# Patient Record
Sex: MALE | Race: BLACK OR AFRICAN AMERICAN | Employment: FULL TIME | URBAN - METROPOLITAN AREA
[De-identification: names, ages, dates, MRNs, and addresses within clinical notes are randomized per-mention and may not be internally consistent; named-entity substitution may affect disease eponyms.]

---

## 2017-05-18 ENCOUNTER — HOSPITAL ENCOUNTER (EMERGENCY)
Age: 37
Discharge: HOME | End: 2017-05-18
Attending: EMERGENCY MEDICINE
Payer: SELF-PAY

## 2017-05-18 VITALS
DIASTOLIC BLOOD PRESSURE: 106 MMHG | BODY MASS INDEX: 34.46 KG/M2 | TEMPERATURE: 98.5 F | WEIGHT: 220 LBS | RESPIRATION RATE: 15 BRPM | HEART RATE: 81 BPM | OXYGEN SATURATION: 100 % | SYSTOLIC BLOOD PRESSURE: 158 MMHG

## 2017-05-18 DIAGNOSIS — M79.672 LEFT FOOT PAIN: ICD-10-CM

## 2017-05-18 DIAGNOSIS — R03.0 ELEVATED BLOOD PRESSURE READING: ICD-10-CM

## 2017-05-18 DIAGNOSIS — Z76.0 MEDICATION REFILL: Primary | ICD-10-CM

## 2017-05-18 PROCEDURE — 99282 EMERGENCY DEPT VISIT SF MDM: CPT

## 2017-05-18 RX ORDER — METFORMIN HYDROCHLORIDE 500 MG/1
500 TABLET ORAL 2 TIMES DAILY WITH MEALS
Qty: 60 TAB | Refills: 1 | Status: SHIPPED | OUTPATIENT
Start: 2017-05-18 | End: 2017-06-17

## 2017-05-18 RX ORDER — LISINOPRIL 20 MG/1
20 TABLET ORAL DAILY
Qty: 30 TAB | Refills: 1 | Status: SHIPPED | OUTPATIENT
Start: 2017-05-18 | End: 2021-11-19 | Stop reason: CLARIF

## 2017-05-18 NOTE — DISCHARGE INSTRUCTIONS
Elevated Blood Pressure: Care Instructions  Your Care Instructions    Blood pressure is a measure of how hard the blood pushes against the walls of your arteries. It's normal for blood pressure to go up and down throughout the day. But if it stays up over time, you have high blood pressure. Two numbers tell you your blood pressure. The first number is the systolic pressure. It shows how hard the blood pushes when your heart is pumping. The second number is the diastolic pressure. It shows how hard the blood pushes between heartbeats, when your heart is relaxed and filling with blood. An ideal blood pressure in adults is less than 120/80 (say \"120 over 80\"). High blood pressure is 140/90 or higher. You have high blood pressure if your top number is 140 or higher or your bottom number is 90 or higher, or both. The main test for high blood pressure is simple, fast, and painless. To diagnose high blood pressure, your doctor will test your blood pressure at different times. After testing your blood pressure, your doctor may ask you to test it again when you are home. If you are diagnosed with high blood pressure, you can work with your doctor to make a long-term plan to manage it. Follow-up care is a key part of your treatment and safety. Be sure to make and go to all appointments, and call your doctor if you are having problems. It's also a good idea to know your test results and keep a list of the medicines you take. How can you care for yourself at home? · Do not smoke. Smoking increases your risk for heart attack and stroke. If you need help quitting, talk to your doctor about stop-smoking programs and medicines. These can increase your chances of quitting for good. · Stay at a healthy weight. · Try to limit how much sodium you eat to less than 2,300 milligrams (mg) a day. Your doctor may ask you to try to eat less than 1,500 mg a day. · Be physically active.  Get at least 30 minutes of exercise on most days of the week. Walking is a good choice. You also may want to do other activities, such as running, swimming, cycling, or playing tennis or team sports. · Avoid or limit alcohol. Talk to your doctor about whether you can drink any alcohol. · Eat plenty of fruits, vegetables, and low-fat dairy products. Eat less saturated and total fats. · Learn how to check your blood pressure at home. When should you call for help? Call your doctor now or seek immediate medical care if:  · Your blood pressure is much higher than normal (such as 180/110 or higher). · You think high blood pressure is causing symptoms such as:  ¨ Severe headache. ¨ Blurry vision. Watch closely for changes in your health, and be sure to contact your doctor if:  · You do not get better as expected. Where can you learn more? Go to http://patrickMedEncentivepaulette.info/. Enter M664 in the search box to learn more about \"Elevated Blood Pressure: Care Instructions. \"  Current as of: October 19, 2016  Content Version: 11.2  © 1352-5188 [employer]. Care instructions adapted under license by [facility] (which disclaims liability or warranty for this information). If you have questions about a medical condition or this instruction, always ask your healthcare professional. [address] any warranty or liability for your use of this information. Foot Pain: Care Instructions  Your Care Instructions  Foot injuries that cause pain and swelling are fairly common. Almost all sports or home repair projects can cause a misstep that ends up as foot pain. Normal wear and tear, especially as you get older, also can cause foot pain. Most minor foot injuries will heal on their own, and home treatment is usually all you need to do. If you have a severe injury, you may need tests and treatment. Follow-up care is a key part of your treatment and safety.  Be sure to make and go to all appointments, and call your doctor if you are having problems. Its also a good idea to know your test results and keep a list of the medicines you take. How can you care for yourself at home? · Take pain medicines exactly as directed. ¨ If the doctor gave you a prescription medicine for pain, take it as prescribed. ¨ If you are not taking a prescription pain medicine, ask your doctor if you can take an over-the-counter medicine. · Rest and protect your foot. Take a break from any activity that may cause pain. · Put ice or a cold pack on your foot for 10 to 20 minutes at a time. Put a thin cloth between the ice and your skin. · Prop up the sore foot on a pillow when you ice it or anytime you sit or lie down during the next 3 days. Try to keep it above the level of your heart. This will help reduce swelling. · Your doctor may recommend that you wrap your foot with an elastic bandage. Keep your foot wrapped for as long as your doctor advises. · If your doctor recommends crutches, use them as directed. · Wear roomy footwear. · As soon as pain and swelling end, begin gentle exercises of your foot. Your doctor can tell you which exercises will help. When should you call for help? Call 911 anytime you think you may need emergency care. For example, call if:  · Your foot turns pale, white, blue, or cold. Call your doctor now or seek immediate medical care if:  · You cannot move or stand on your foot. · Your foot looks twisted or out of its normal position. · Your foot is not stable when you step down. · You have signs of infection, such as:  ¨ Increased pain, swelling, warmth, or redness. ¨ Red streaks leading from the sore area. ¨ Pus draining from a place on your foot. ¨ A fever. · Your foot is numb or tingly. Watch closely for changes in your health, and be sure to contact your doctor if:  · You do not get better as expected. · You have bruises from an injury that last longer than 2 weeks.   Where can you learn more?  Go to http://patrick-paulette.info/. Enter P164 in the search box to learn more about \"Foot Pain: Care Instructions. \"  Current as of: May 23, 2016  Content Version: 11.2  © 2432-7284 [employer]. Care instructions adapted under license by [facility] (which disclaims liability or warranty for this information). If you have questions about a medical condition or this instruction, always ask your healthcare professional. [facility] [address] any warranty or liability for your use of this information. DataC[employer]d Activation    Thank you for requesting access to [employer]. Please follow the instructions below to securely access and download your online medical record. [employer] allows you to send messages to your doctor, view your test results, renew your prescriptions, schedule appointments, and more. How Do I Sign Up? 1. In your internet browser, go to [email or web address]  2. Click on the First Time User? Click Here link in the Sign In box. You will be redirect to the New Member Sign Up page. 3. Enter your [employer] Access Code exactly as it appears below. You will not need to use this code after youve completed the sign-up process. If you do not sign up before the expiration date, you must request a new code. [employer]d Access Code: [email or web address]  Expires: 2017 11:15 AM (This is the date your [employer] access code will )    4. Enter the last four digits of your Social Security Number (xxxx) and Date of Birth (mm/dd/yyyy) as indicated and click Submit. You will be taken to the next sign-up page. 5. Create a DataCrowd ID. This will be your [employer]d login ID and cannot be changed, so think of one that is secure and easy to remember. 6. Create a [employer]d password. You can change your password at any time. 7. Enter your Password Reset Question and Answer. This can be used at a later time if you forget your password. 8. Enter your e-mail address. You will receive e-mail notification when new information is available in [address] 9. Click Sign Up. You can now view and download portions of your medical record. 10. Click the Download Summary menu link to download a portable copy of your medical information. Additional Information    If you have questions, please visit the Frequently Asked Questions section of the [facility] website at [email or web address] Remember, [facility] is NOT to be used for urgent needs. For medical emergencies, dial 911.

## 2021-11-17 ENCOUNTER — TRANSCRIBE ORDER (OUTPATIENT)
Dept: SCHEDULING | Age: 41
End: 2021-11-17

## 2021-11-17 DIAGNOSIS — C20 MALIGNANT NEOPLASM OF RECTUM (HCC): Primary | ICD-10-CM

## 2021-11-18 ENCOUNTER — HOSPITAL ENCOUNTER (OUTPATIENT)
Dept: LAB | Age: 41
Discharge: HOME | End: 2021-11-18

## 2021-11-18 ENCOUNTER — HOSPITAL ENCOUNTER (OUTPATIENT)
Dept: LAB | Age: 41
Discharge: HOME | End: 2021-11-18
Payer: COMMERCIAL

## 2021-11-18 ENCOUNTER — OFFICE VISIT (OUTPATIENT)
Dept: SURGERY | Age: 41
End: 2021-11-18
Payer: COMMERCIAL

## 2021-11-18 VITALS
DIASTOLIC BLOOD PRESSURE: 80 MMHG | WEIGHT: 229 LBS | RESPIRATION RATE: 18 BRPM | HEART RATE: 78 BPM | OXYGEN SATURATION: 99 % | TEMPERATURE: 97.8 F | SYSTOLIC BLOOD PRESSURE: 150 MMHG | HEIGHT: 67 IN | BODY MASS INDEX: 35.94 KG/M2

## 2021-11-18 DIAGNOSIS — C20 RECTAL CANCER (HCC): Primary | ICD-10-CM

## 2021-11-18 DIAGNOSIS — C20 RECTAL CANCER (HCC): ICD-10-CM

## 2021-11-18 LAB
ATRIAL RATE: 71 BPM
CALCULATED P AXIS, ECG09: 29 DEGREES
CALCULATED R AXIS, ECG10: 50 DEGREES
CALCULATED T AXIS, ECG11: 55 DEGREES
DIAGNOSIS, 93000: NORMAL
P-R INTERVAL, ECG05: 136 MS
Q-T INTERVAL, ECG07: 390 MS
QRS DURATION, ECG06: 88 MS
QTC CALCULATION (BEZET), ECG08: 423 MS
VENTRICULAR RATE, ECG03: 71 BPM
XX-LABCORP SPECIMEN COL,LCBCF: NORMAL

## 2021-11-18 PROCEDURE — 93005 ELECTROCARDIOGRAM TRACING: CPT

## 2021-11-18 PROCEDURE — 99001 SPECIMEN HANDLING PT-LAB: CPT

## 2021-11-18 PROCEDURE — 99204 OFFICE O/P NEW MOD 45 MIN: CPT | Performed by: COLON & RECTAL SURGERY

## 2021-11-18 RX ORDER — AMLODIPINE BESYLATE 5 MG/1
10 TABLET ORAL DAILY
COMMUNITY
Start: 2021-11-02

## 2021-11-18 RX ORDER — GLIMEPIRIDE 2 MG/1
4 TABLET ORAL DAILY
COMMUNITY

## 2021-11-18 RX ORDER — BENAZEPRIL HYDROCHLORIDE 20 MG/1
20 TABLET ORAL DAILY
COMMUNITY
Start: 2021-11-02

## 2021-11-18 RX ORDER — METFORMIN HYDROCHLORIDE 500 MG/1
500 TABLET, EXTENDED RELEASE ORAL 2 TIMES DAILY
COMMUNITY
Start: 2021-11-13 | End: 2022-10-14 | Stop reason: ALTCHOICE

## 2021-11-18 RX ORDER — ERGOCALCIFEROL 1.25 MG/1
CAPSULE ORAL
COMMUNITY

## 2021-11-18 RX ORDER — IBUPROFEN 800 MG/1
TABLET ORAL AS NEEDED
COMMUNITY
Start: 2021-11-03 | End: 2022-02-15

## 2021-11-18 NOTE — H&P (VIEW-ONLY)
HPI: Dandre Gautam is a 35 y.o. male presenting with chief complain of newly diagnosed rectal cancer. The patient had a change in his bowel function and blood per rectum for the last 6 months. Normally he was moving his bowels daily, now he moves them 6-7 times. He denies fecal incontinence. He has no family history of colon cancer. He has never had a colonoscopy. Past Medical History:   Diagnosis Date    Brain aneurysm     at [address] of [address]   Sarah Stabs Diabetes ([facility] [address]     Hypertension        Past Surgical History:   Procedure Laterality Date    HX COLONOSCOPY      HX INTRACRANIAL ANEURYSM REPAIR      age [address]       Family medical history negative for colorectal cancer    Social History     Socioeconomic History    Marital status: SINGLE   Tobacco Use    Smoking status: Never Smoker    Smokeless tobacco: Never Used   Substance and Sexual Activity    Alcohol use: Yes     Comment: socially    Drug use: No       Review of Systems - Review of Systems   Constitutional: Positive for malaise/fatigue. Negative for chills, diaphoresis, fever and weight loss. HENT: Negative. Eyes: Negative. Respiratory: Negative. Cardiovascular: Negative. Gastrointestinal: Positive for abdominal pain, blood in stool, constipation, diarrhea and melena. Negative for heartburn, nausea and vomiting. Left side towards back   Genitourinary: Negative. Musculoskeletal: Positive for back pain. Negative for falls, joint pain, myalgias and neck pain. Skin: Negative. Neurological: Negative. Endo/Heme/Allergies: Negative. Psychiatric/Behavioral: Negative for depression, hallucinations, memory loss, substance abuse and suicidal ideas. The patient is nervous/anxious. The patient does not have insomnia.           Outpatient Medications Marked as Taking for the 11/18/21 encounter (Office Visit) with Bogdan Islas MD   Medication Sig Dispense Refill    ibuprofen (MOTRIN) 800 mg tablet          No Known Allergies    Vitals:    11/18/21 0912   Resp: 18   Weight: 103.9 kg (229 lb)   Height: 5' 7\" (1.702 m)   PainSc:   3   PainLoc: Back       Physical Exam  Constitutional:       Appearance: He is well-developed. HENT:      Head: Normocephalic and atraumatic. Eyes:      Conjunctiva/sclera: Conjunctivae normal.   Abdominal:      General: There is no distension. Palpations: Abdomen is soft. There is no mass. Tenderness: There is no abdominal tenderness. Musculoskeletal:         General: Normal range of motion. Lymphadenopathy:      Cervical: No cervical adenopathy. Skin:     General: Skin is warm and dry. Neurological:      Sensory: No sensory deficit. Psychiatric:         Speech: Speech normal.     Rectum: Normal tone, no mass    Colonoscopy report reviewed, rectal mass 10 to 15 cm from the anus  Pathology consistent with invasive adenocarcinoma    Assessment / Plan    Newly diagnosed rectal cancer  CT chest abdomen pelvis, MRI pelvis for staging  Schedule for flexible sigmoidoscopy for surgical planning  Follow-up here after all the studies are done    The diagnoses and plan were discussed with the patient. All questions answered. Plan of care agreed to by all concerned.

## 2021-11-18 NOTE — PROGRESS NOTES
HPI: Lana Spain is a 35 y.o. male presenting with chief complain of newly diagnosed rectal cancer. The patient had a change in his bowel function and blood per rectum for the last 6 months. Normally he was moving his bowels daily, now he moves them 6-7 times. He denies fecal incontinence. He has no family history of colon cancer. He has never had a colonoscopy. Past Medical History:   Diagnosis Date    Brain aneurysm     at 10years of age   [address] [facility] [address] Diabetes ([facility] [address]     Hypertension        Past Surgical History:   Procedure Laterality Date    HX COLONOSCOPY      HX INTRACRANIAL ANEURYSM REPAIR      age 10       Family medical history negative for colorectal cancer    Social History     Socioeconomic History    Marital status: SINGLE   Tobacco Use    Smoking status: Never Smoker    Smokeless tobacco: Never Used   Substance and Sexual Activity    Alcohol use: Yes     Comment: socially    Drug use: No       Review of Systems - Review of Systems   Constitutional: Positive for malaise/fatigue. Negative for chills, diaphoresis, fever and weight loss. HENT: Negative. Eyes: Negative. Respiratory: Negative. Cardiovascular: Negative. Gastrointestinal: Positive for abdominal pain, blood in stool, constipation, diarrhea and melena. Negative for heartburn, nausea and vomiting. Left side towards back   Genitourinary: Negative. Musculoskeletal: Positive for back pain. Negative for falls, joint pain, myalgias and neck pain. Skin: Negative. Neurological: Negative. Endo/Heme/Allergies: Negative. Psychiatric/Behavioral: Negative for depression, hallucinations, memory loss, substance abuse and suicidal ideas. The patient is nervous/anxious. The patient does not have insomnia.           Outpatient Medications Marked as Taking for the 11/18/21 encounter (Office Visit) with Rachel Wilson MD   Medication Sig Dispense Refill    ibuprofen (MOTRIN) 800 mg tablet          No Known Allergies    Vitals:    11/18/21 0912   Resp: 18   Weight: 103.9 kg (229 lb)   Height: 5' 7\" (1.702 m)   PainSc:   3   PainLoc: Back       Physical Exam  Constitutional:       Appearance: He is well-developed. HENT:      Head: Normocephalic and atraumatic. Eyes:      Conjunctiva/sclera: Conjunctivae normal.   Abdominal:      General: There is no distension. Palpations: Abdomen is soft. There is no mass. Tenderness: There is no abdominal tenderness. Musculoskeletal:         General: Normal range of motion. Lymphadenopathy:      Cervical: No cervical adenopathy. Skin:     General: Skin is warm and dry. Neurological:      Sensory: No sensory deficit. Psychiatric:         Speech: Speech normal.     Rectum: Normal tone, no mass    Colonoscopy report reviewed, rectal mass 10 to 15 cm from the anus  Pathology consistent with invasive adenocarcinoma    Assessment / Plan    Newly diagnosed rectal cancer  CT chest abdomen pelvis, MRI pelvis for staging  Schedule for flexible sigmoidoscopy for surgical planning  Follow-up here after all the studies are done    The diagnoses and plan were discussed with the patient. All questions answered. Plan of care agreed to by all concerned.

## 2021-11-19 LAB
BUN SERPL-MCNC: 9 MG/DL (ref 6–24)
BUN/CREAT SERPL: 11 (ref 9–20)
CALCIUM SERPL-MCNC: 8.5 MG/DL (ref 8.7–10.2)
CEA SERPL-MCNC: 5.5 NG/ML (ref 0–4.7)
CHLORIDE SERPL-SCNC: 106 MMOL/L (ref 96–106)
CO2 SERPL-SCNC: 22 MMOL/L (ref 20–29)
CREAT SERPL-MCNC: 0.81 MG/DL (ref 0.76–1.27)
GLUCOSE SERPL-MCNC: 120 MG/DL (ref 65–99)
POTASSIUM SERPL-SCNC: 4.4 MMOL/L (ref 3.5–5.2)
SODIUM SERPL-SCNC: 140 MMOL/L (ref 134–144)

## 2021-11-19 NOTE — PERIOP NOTES
PRE-SURGICAL INSTRUCTIONS        Patient's Name:  Stacey Kendall      QNMXV'R Date:  11/19/2021            Covid Testing Date and Time: N/A Vaccinated    Surgery Date:  11/24/2021                1. Do NOT eat or drink anything, including candy, gum, or ice chips after midnight on 11/23/21, unless you have specific instructions from your surgeon or anesthesia provider to do so.  2. You may brush your teeth before coming to the hospital.  3. No smoking 24 hours prior to the day of surgery. 4. No alcohol 24 hours prior to the day of surgery. 5. No recreational drugs for one week prior to the day of surgery. 6. Leave all valuables, including money/purse, at home. 7. Remove all jewelry, nail polish, acrylic nails, and makeup (including mascara); no lotions powders, deodorant, or perfume/cologne/after shave on the skin. 8. Follow instruction for Hibiclens washes and CHG wipes from surgeon's office. 9. Glasses/contact lenses and dentures may be worn to the hospital.  They will be removed prior to surgery. 10. Call your doctor if symptoms of a cold or illness develop within 24-48 hours prior to your surgery. 11.  If you are having an outpatient procedure, please make arrangements for a responsible ADULT TO [address] [place] [address] and stay with you for 24 hours after your surgery. 12. ONE VISITOR in the hospital at this time for outpatient procedures. Exceptions may be made for surgical admissions, per nursing unit guidelines      Special Instructions:      Bring list of CURRENT medications. Bring any pertinent legal medical records. Take blood pressure medications the morning of surgery with a sip of water. Follow physician instructions about stopping anticoagulants. Complete bowel prep per MD instructions. On the day of surgery, come in the main entrance of [facility]. Let the  at the desk know you are there for surgery.   A staff member will come escort you to the surgical area on the second floor. If you have any questions or concerns, please do not hesitate to call:     (Prior to the day of surgery) PAT department:  [phone]   (Day of surgery) Pre-Op department:  [phone]    These surgical instructions were reviewed with patient during the PAT phone call.

## 2021-11-23 ENCOUNTER — ANESTHESIA EVENT (OUTPATIENT)
Dept: ENDOSCOPY | Age: 41
End: 2021-11-23
Payer: COMMERCIAL

## 2021-11-23 ENCOUNTER — HOSPITAL ENCOUNTER (OUTPATIENT)
Dept: CT IMAGING | Age: 41
Discharge: HOME | End: 2021-11-23
Attending: INTERNAL MEDICINE
Payer: COMMERCIAL

## 2021-11-23 DIAGNOSIS — C20 MALIGNANT NEOPLASM OF RECTUM (HCC): ICD-10-CM

## 2021-11-23 PROCEDURE — 74177 CT ABD & PELVIS W/CONTRAST: CPT

## 2021-11-23 PROCEDURE — 74011000636 HC RX REV CODE- 636: Performed by: INTERNAL MEDICINE

## 2021-11-23 RX ADMIN — IOPAMIDOL 100 ML: 612 INJECTION, SOLUTION INTRAVENOUS at 07:49

## 2021-11-24 ENCOUNTER — ANESTHESIA (OUTPATIENT)
Dept: ENDOSCOPY | Age: 41
End: 2021-11-24
Payer: COMMERCIAL

## 2021-11-24 ENCOUNTER — HOSPITAL ENCOUNTER (OUTPATIENT)
Age: 41
Setting detail: OUTPATIENT SURGERY
Discharge: HOME | End: 2021-11-24
Attending: COLON & RECTAL SURGERY | Admitting: COLON & RECTAL SURGERY
Payer: COMMERCIAL

## 2021-11-24 VITALS
TEMPERATURE: 99.3 F | RESPIRATION RATE: 14 BRPM | HEIGHT: 67 IN | HEART RATE: 83 BPM | BODY MASS INDEX: 35 KG/M2 | OXYGEN SATURATION: 98 % | SYSTOLIC BLOOD PRESSURE: 149 MMHG | WEIGHT: 223 LBS | DIASTOLIC BLOOD PRESSURE: 86 MMHG

## 2021-11-24 LAB
AMPHET UR QL SCN: NEGATIVE
BARBITURATES UR QL SCN: NEGATIVE
BENZODIAZ UR QL: NEGATIVE
CANNABINOIDS UR QL SCN: NEGATIVE
COCAINE UR QL SCN: NEGATIVE
GLUCOSE BLD STRIP.AUTO-MCNC: 157 MG/DL (ref 70–110)
GLUCOSE BLD STRIP.AUTO-MCNC: 179 MG/DL (ref 70–110)
GLUCOSE BLD STRIP.AUTO-MCNC: 185 MG/DL (ref 70–110)
HDSCOM,HDSCOM: NORMAL
METHADONE UR QL: NEGATIVE
OPIATES UR QL: NEGATIVE
PCP UR QL: NEGATIVE

## 2021-11-24 PROCEDURE — 74011250636 HC RX REV CODE- 250/636: Performed by: NURSE ANESTHETIST, CERTIFIED REGISTERED

## 2021-11-24 PROCEDURE — 74011000250 HC RX REV CODE- 250: Performed by: NURSE ANESTHETIST, CERTIFIED REGISTERED

## 2021-11-24 PROCEDURE — 80307 DRUG TEST PRSMV CHEM ANLYZR: CPT

## 2021-11-24 PROCEDURE — 82962 GLUCOSE BLOOD TEST: CPT

## 2021-11-24 PROCEDURE — 76040000019: Performed by: COLON & RECTAL SURGERY

## 2021-11-24 PROCEDURE — 74011250637 HC RX REV CODE- 250/637: Performed by: NURSE ANESTHETIST, CERTIFIED REGISTERED

## 2021-11-24 PROCEDURE — 77030021593 HC FCPS BIOP ENDOSC BSC -A: Performed by: COLON & RECTAL SURGERY

## 2021-11-24 PROCEDURE — 76060000031 HC ANESTHESIA FIRST 0.5 HR: Performed by: COLON & RECTAL SURGERY

## 2021-11-24 PROCEDURE — C1729 CATH, DRAINAGE: HCPCS | Performed by: COLON & RECTAL SURGERY

## 2021-11-24 PROCEDURE — 2709999900 HC NON-CHARGEABLE SUPPLY: Performed by: COLON & RECTAL SURGERY

## 2021-11-24 PROCEDURE — 77030008565 HC TBNG SUC IRR ERBE -B: Performed by: COLON & RECTAL SURGERY

## 2021-11-24 PROCEDURE — 77030031670 HC DEV INFL ENDOTEK BIG60 MRTM -B: Performed by: COLON & RECTAL SURGERY

## 2021-11-24 PROCEDURE — 45330 DIAGNOSTIC SIGMOIDOSCOPY: CPT | Performed by: COLON & RECTAL SURGERY

## 2021-11-24 PROCEDURE — 77030013992 HC SNR POLYP ENDOSC BSC -B: Performed by: COLON & RECTAL SURGERY

## 2021-11-24 PROCEDURE — 00811 ANES LWR INTST NDSC NOS: CPT | Performed by: NURSE ANESTHETIST, CERTIFIED REGISTERED

## 2021-11-24 PROCEDURE — 74011636637 HC RX REV CODE- 636/637: Performed by: NURSE ANESTHETIST, CERTIFIED REGISTERED

## 2021-11-24 PROCEDURE — 00811 ANES LWR INTST NDSC NOS: CPT | Performed by: ANESTHESIOLOGY

## 2021-11-24 RX ORDER — SODIUM CHLORIDE, SODIUM LACTATE, POTASSIUM CHLORIDE, CALCIUM CHLORIDE 600; 310; 30; 20 MG/100ML; MG/100ML; MG/100ML; MG/100ML
25 INJECTION, SOLUTION INTRAVENOUS CONTINUOUS
Status: CANCELLED | OUTPATIENT
Start: 2021-11-24

## 2021-11-24 RX ORDER — INSULIN LISPRO 100 [IU]/ML
INJECTION, SOLUTION INTRAVENOUS; SUBCUTANEOUS ONCE
Status: COMPLETED | OUTPATIENT
Start: 2021-11-24 | End: 2021-11-24

## 2021-11-24 RX ORDER — INSULIN LISPRO 100 [IU]/ML
INJECTION, SOLUTION INTRAVENOUS; SUBCUTANEOUS ONCE
Status: CANCELLED | OUTPATIENT
Start: 2021-11-24 | End: 2021-11-24

## 2021-11-24 RX ORDER — LIDOCAINE HYDROCHLORIDE 10 MG/ML
0.1 INJECTION, SOLUTION EPIDURAL; INFILTRATION; INTRACAUDAL; PERINEURAL AS NEEDED
Status: DISCONTINUED | OUTPATIENT
Start: 2021-11-24 | End: 2021-11-24 | Stop reason: HOSPADM

## 2021-11-24 RX ORDER — DEXTROSE 50 % IN WATER (D50W) INTRAVENOUS SYRINGE
25-50 AS NEEDED
Status: DISCONTINUED | OUTPATIENT
Start: 2021-11-24 | End: 2021-11-24 | Stop reason: HOSPADM

## 2021-11-24 RX ORDER — SODIUM CHLORIDE 0.9 % (FLUSH) 0.9 %
5-40 SYRINGE (ML) INJECTION AS NEEDED
Status: CANCELLED | OUTPATIENT
Start: 2021-11-24

## 2021-11-24 RX ORDER — MAGNESIUM SULFATE 100 %
4 CRYSTALS MISCELLANEOUS AS NEEDED
Status: DISCONTINUED | OUTPATIENT
Start: 2021-11-24 | End: 2021-11-24 | Stop reason: HOSPADM

## 2021-11-24 RX ORDER — DEXTROSE 50 % IN WATER (D50W) INTRAVENOUS SYRINGE
25-50 AS NEEDED
Status: CANCELLED | OUTPATIENT
Start: 2021-11-24

## 2021-11-24 RX ORDER — SODIUM CHLORIDE 0.9 % (FLUSH) 0.9 %
5-40 SYRINGE (ML) INJECTION EVERY 8 HOURS
Status: CANCELLED | OUTPATIENT
Start: 2021-11-24

## 2021-11-24 RX ORDER — PROPOFOL 10 MG/ML
INJECTION, EMULSION INTRAVENOUS AS NEEDED
Status: DISCONTINUED | OUTPATIENT
Start: 2021-11-24 | End: 2021-11-24 | Stop reason: HOSPADM

## 2021-11-24 RX ORDER — MAGNESIUM SULFATE 100 %
4 CRYSTALS MISCELLANEOUS AS NEEDED
Status: CANCELLED | OUTPATIENT
Start: 2021-11-24

## 2021-11-24 RX ORDER — FAMOTIDINE 20 MG/1
20 TABLET, FILM COATED ORAL ONCE
Status: COMPLETED | OUTPATIENT
Start: 2021-11-24 | End: 2021-11-24

## 2021-11-24 RX ORDER — LIDOCAINE HYDROCHLORIDE 20 MG/ML
INJECTION, SOLUTION EPIDURAL; INFILTRATION; INTRACAUDAL; PERINEURAL AS NEEDED
Status: DISCONTINUED | OUTPATIENT
Start: 2021-11-24 | End: 2021-11-24 | Stop reason: HOSPADM

## 2021-11-24 RX ORDER — SODIUM CHLORIDE, SODIUM LACTATE, POTASSIUM CHLORIDE, CALCIUM CHLORIDE 600; 310; 30; 20 MG/100ML; MG/100ML; MG/100ML; MG/100ML
75 INJECTION, SOLUTION INTRAVENOUS CONTINUOUS
Status: DISCONTINUED | OUTPATIENT
Start: 2021-11-24 | End: 2021-11-24 | Stop reason: HOSPADM

## 2021-11-24 RX ADMIN — PROPOFOL 40 MG: 10 INJECTION, EMULSION INTRAVENOUS at 11:34

## 2021-11-24 RX ADMIN — SODIUM CHLORIDE, SODIUM LACTATE, POTASSIUM CHLORIDE, AND CALCIUM CHLORIDE 75 ML/HR: 600; 310; 30; 20 INJECTION, SOLUTION INTRAVENOUS at 11:12

## 2021-11-24 RX ADMIN — PROPOFOL 80 MG: 10 INJECTION, EMULSION INTRAVENOUS at 11:33

## 2021-11-24 RX ADMIN — FAMOTIDINE 20 MG: 20 TABLET ORAL at 11:13

## 2021-11-24 RX ADMIN — LIDOCAINE HYDROCHLORIDE 80 MG: 20 INJECTION, SOLUTION EPIDURAL; INFILTRATION; INTRACAUDAL; PERINEURAL at 11:34

## 2021-11-24 RX ADMIN — INSULIN LISPRO 3 UNITS: 100 INJECTION, SOLUTION INTRAVENOUS; SUBCUTANEOUS at 11:21

## 2021-11-24 NOTE — ANESTHESIA POSTPROCEDURE EVALUATION
Procedure(s):  SIGMOIDOSCOPY FLEXIBLE. MAC    Anesthesia Post Evaluation      Multimodal analgesia: multimodal analgesia used between 6 hours prior to anesthesia start to PACU discharge  Patient location during evaluation: PACU  Patient participation: complete - patient participated  Level of consciousness: awake and alert  Pain management: adequate  Airway patency: patent  Anesthetic complications: no  Cardiovascular status: acceptable  Respiratory status: acceptable  Hydration status: acceptable  Post anesthesia nausea and vomiting:  controlled  Final Post Anesthesia Temperature Assessment:  Normothermia (36.0-37.5 degrees C)      INITIAL Post-op Vital signs:   Vitals Value Taken Time   /86 11/24/21 1153   Temp     Pulse 92 11/24/21 1154   Resp 17 11/24/21 1154   SpO2 100 % 11/24/21 1154   Vitals shown include unvalidated device data.

## 2021-11-24 NOTE — ANESTHESIA PREPROCEDURE EVALUATION
Relevant Problems   No relevant active problems       Anesthetic History   No history of anesthetic complications            Review of Systems / Medical History  Patient summary reviewed and pertinent labs reviewed    Pulmonary  Within defined limits                 Neuro/Psych   Within defined limits           Cardiovascular    Hypertension                   GI/Hepatic/Renal  Within defined limits              Endo/Other    Diabetes: type 2         Other Findings              Physical Exam    Airway  Mallampati: I  TM Distance: > 6 cm  Neck ROM: normal range of motion        Cardiovascular  Regular rate and rhythm,  S1 and S2 normal,  no murmur, click, rub, or gallop             Dental  No notable dental hx       Pulmonary  Breath sounds clear to auscultation               Abdominal  GI exam deferred       Other Findings            Anesthetic Plan    ASA: 3  Anesthesia type: MAC          Induction: Intravenous  Anesthetic plan and risks discussed with: Patient

## 2021-11-24 NOTE — INTERVAL H&P NOTE
Update History & Physical    The Patient's History and Physical of November 18, 2021 was reviewed with the patient and I examined the patient. There was no change. The surgical site was confirmed by the patient and me. Plan:  The risk, benefits, expected outcome, and alternative to the recommended procedure have been discussed with the patient. Patient understands and wants to proceed with the procedure.     Electronically signed by Jannie Harris MD on 11/24/2021 at 10:03 AM

## 2021-11-24 NOTE — OP NOTES
[facility]  [address] [facility], [address]   [place], [address]  [phone]                    Sigmoidoscopy Procedure Note      Wanda Solorio  1980  [id number]                Date of Procedure: 11/24/2021    Preoperative diagnosis: Rectal Cancer:  C20    Postoperative diagnosis: Rectal cancer    :  Saima Peterson MD    Assistant(s): Endoscopy Technician-1: Estevan Sherman  Endoscopy RN-1: Yesenia Knowles RN    Sedation: MAC    Complications: None    Implants: None    Procedure Details:  Prior to the procedure, a history and physical were performed. The patients medications, allergies and sensitivities were reviewed and all questions were answered. After informed consent was obtained for the procedure, with all risks and benefits of procedure explained. The patient was taken to the endoscopy suite and placed in the left lateral decubitus position. Patient identification and proposed procedure were verified prior to the procedure by the nurse and I. After sequential anesthesia administered by anesthesiologist, a digital rectal exam was performed and was normal.  The Olympus video colonoscope was introduced through the anus and advanced to sigmoid colon. The quality of preparation was fair. The colonoscope was slowly withdrawn and the mucosa examined for any abnormalities. Cecal withdrawal time was greater than 6 minutes. The patient tolerated the procedure well. There were no complications. Findings/Interventions:   Polyps - none    Rectal cancer just above 3rd valve, 5 cm long, 75% circumference posterior based, anterior spared, non-obstructing, ulcerated. EBL: none    Recommendations: F/U in office after MRI pelvis. Resume normal medication(s).      Discharge Disposition:  Duy Obando MD  11/24/2021  11:41 AM

## 2021-11-24 NOTE — DISCHARGE INSTRUCTIONS
Patient Education        Sigmoidoscopy: What to Expect at [facility]     A sigmoidoscopy lets your doctor look inside the lower part of your large intestine. This is also called the colon. The doctor uses a lighted tube called a sigmoidoscope (or scope). This test let the doctor look for small growths (called polyps), cancer, bleeding, hemorrhoids, or other problems. The doctor also may have used the scope to remove polyps. Or he or she may have used it to take tissue samples that need to be tested. You shouldn't have any pain after the procedure. But it is normal to pass gas. You may have mild discomfort from having gas. If your doctor removed polyps, you will likely need to schedule a colonoscopy to look at the whole colon. This care sheet gives you a general idea about how long it will take for you to recover. But each person recovers at a different pace. Follow the steps below to get better as quickly as possible. How can you care for yourself at home? Activity    · Most people are able to return to work right away unless they have had a sedative during the procedure.     · You may need someone to drive you home if you have had a sedative. In most cases, you can drive yourself home. Diet    · You can eat your normal diet. If your stomach is upset, try bland, low-fat foods like plain rice, broiled chicken, toast, and yogurt.     · Be sure to drink plenty of liquids to replace those you have lost during the preparation for the procedure. Exercise    · You can return to normal exercise right away. Medicine    · Your doctor will tell you if and when you can restart your medicines. He or she will also give you instructions about taking any new medicines.     · If you take aspirin or some other blood thinner, ask your doctor if and when to start taking it again. Make sure that you understand exactly what your doctor wants you to do. Follow-up care is a key part of your treatment and safety.  Be sure to make and go to all appointments, and call your doctor if you are having problems. It's also a good idea to know your test results and keep a list of the medicines you take. When should you call for help? Call your doctor now or seek immediate medical care if:    · You have new or worse belly pain.     · You have blood in your stools. Watch closely for changes in your health, and be sure to contact your doctor if you have any problems. Where can you learn more? Go to http://www.gray.com/  Enter [id number] in the search box to learn more about \"Sigmoidoscopy: What to Expect at Home. \"  Current as of: February 10, 2021               Content Version: 13.0  © 2006-2021 [employer]. Care instructions adapted under license by [employer] (which disclaims liability or warranty for this information). If you have questions about a medical condition or this instruction, always ask your healthcare professional. [place] [address] any warranty or liability for your use of this information. Patient Education     DISCHARGE SUMMARY from Nurse    PATIENT INSTRUCTIONS:    After general anesthesia or intravenous sedation, for 24 hours or while taking prescription Narcotics:  · Limit your activities  · Do not drive and operate hazardous machinery  · Do not make important personal or business decisions  · Do  not drink alcoholic beverages  · If you have not urinated within 8 hours after discharge, please contact your surgeon on call.     Report the following to your surgeon:  · Excessive pain, swelling, redness or odor of or around the surgical area  · Temperature over 100.5  · Nausea and vomiting lasting longer than 4 hours or if unable to take medications  · Any signs of decreased circulation or nerve impairment to extremity: change in color, persistent  numbness, tingling, coldness or increase pain  · Any questions    What to do at Home:    *  Please give a list of your current medications to your Primary Care Provider. *  Please update this list whenever your medications are discontinued, doses are      changed, or new medications (including over-the-counter products) are added. *  Please carry medication information at all times in case of emergency situations. These are general instructions for a healthy lifestyle:    No smoking/ No tobacco products/ Avoid exposure to second hand smoke  Surgeon General's Warning:  Quitting smoking now greatly reduces serious risk to your health. Obesity, smoking, and sedentary lifestyle greatly increases your risk for illness    A healthy diet, regular physical exercise & weight monitoring are important for maintaining a healthy lifestyle    You may be retaining fluid if you have a history of heart failure or if you experience any of the following symptoms:  Weight gain of 3 pounds or more overnight or 5 pounds in a week, increased swelling in our hands or feet or shortness of breath while lying flat in bed. Please call your doctor as soon as you notice any of these symptoms; do not wait until your next office visit. The discharge information has been reviewed with the patient. The patient verbalized understanding. Discharge medications reviewed with the patient and appropriate educational materials and side effects teaching were provided. ___________________________________________________________________________________________________________________________________     Learning About Rectal Cancer  What is rectal cancer? Rectal cancer occurs when cells that are not normal grow in your rectum. These cells often form in small growths called polyps. Not all rectal polyps turn into cancer. But most rectal cancer starts in a polyp. Rectal cancer occurs most often in people older than 50. What happens when you have rectal cancer? Rectal cancer usually grows very slowly.  It usually takes years for the cancer to become large enough to cause symptoms. If the cancer is not removed and keeps growing, it eventually will invade and destroy nearby tissues and then spread farther, first to nearby lymph nodes. From there it may spread to other parts of the body. What are the symptoms? Rectal cancer in its early stages usually doesn't cause any symptoms. Symptoms occur later, when the cancer may be harder to treat. The most common symptoms include:  · Blood in your stool or very dark stools. · A change in your bowel habits, such as more frequent stools or a feeling that your bowels are not emptying completely. · Pain in the belly or rectal pain. · Low energy. How can you prevent rectal cancer? Screening tests can find or prevent many cases of rectal cancer. They look for a certain disease or condition before any symptoms appear. Screening tests that may find rectal cancer early include:  · Stool tests, such as the fecal occult blood test.  · Sigmoidoscopy, which lets your doctor look at the inside of the lower part of your colon using a lighted tube. · Colonoscopy, which lets your doctor look at the inside of your entire colon using a thin, flexible tube. Experts recommend starting screening at age 39 for people who are at average risk. Talk with your doctor about your risk and when to start and stop screening. Here are other things you can do to help prevent rectal cancer:  · Watch your weight. Being very overweight may increase your chance of getting rectal cancer. · Eat well. Eat more whole grains, fruits, vegetables, poultry, and fish. And eat less red meat, refined grains, and sweets. · If you drink alcohol, limit how much you drink. Any amount of alcohol may increase your risk for some types of cancer. · Get active. Keep up a physically active lifestyle. · Do not smoke. Smoking can make rectal cancer more likely. If you need help quitting, talk to your doctor about stop-smoking programs and medicines. These can increase your chances of quitting for good. How is rectal cancer treated? Surgery is almost always used to treat rectal cancer. The cancer is more easily removed when it is found early. If the cancer has spread beyond the rectum, you may also need radiation or chemotherapy. Other medicines that destroy cancer cells, such as targeted therapy or immunotherapy, may also be used. Follow-up care is a key part of your treatment and safety. Be sure to make and go to all appointments, and call your doctor if you are having problems. It's also a good idea to know your test results and keep a list of the medicines you take. Where can you learn more? Go to http://www.gray.com/  Enter K399 in the search box to learn more about \"Learning About Rectal Cancer. \"  Current as of: December 17, 2020               Content Version: 13.0  © 0807-4503 [employer]. Care instructions adapted under license by [employer] (which disclaims liability or warranty for this information). If you have questions about a medical condition or this instruction, always ask your healthcare professional. [address] any warranty or liability for your use of this information.

## 2021-12-01 ENCOUNTER — HOSPITAL ENCOUNTER (OUTPATIENT)
Age: 41
Discharge: HOME | End: 2021-12-01
Attending: COLON & RECTAL SURGERY

## 2021-12-01 DIAGNOSIS — C20 RECTAL CANCER (HCC): ICD-10-CM

## 2022-01-06 ENCOUNTER — DOCUMENTATION ONLY (OUTPATIENT)
Dept: SURGERY | Age: 42
End: 2022-01-06

## 2022-01-06 ENCOUNTER — OFFICE VISIT (OUTPATIENT)
Dept: SURGERY | Age: 42
End: 2022-01-06
Payer: COMMERCIAL

## 2022-01-06 ENCOUNTER — DOCUMENTATION ONLY (OUTPATIENT)
Dept: OTHER | Age: 42
End: 2022-01-06

## 2022-01-06 ENCOUNTER — HOSPITAL ENCOUNTER (OUTPATIENT)
Dept: LAB | Age: 42
Discharge: HOME | End: 2022-01-06
Payer: COMMERCIAL

## 2022-01-06 VITALS
OXYGEN SATURATION: 97 % | WEIGHT: 231 LBS | HEIGHT: 67 IN | DIASTOLIC BLOOD PRESSURE: 75 MMHG | SYSTOLIC BLOOD PRESSURE: 139 MMHG | RESPIRATION RATE: 18 BRPM | BODY MASS INDEX: 36.26 KG/M2 | TEMPERATURE: 97.9 F | HEART RATE: 91 BPM

## 2022-01-06 DIAGNOSIS — C20 RECTAL CANCER (HCC): ICD-10-CM

## 2022-01-06 DIAGNOSIS — C20 RECTAL CANCER (HCC): Primary | ICD-10-CM

## 2022-01-06 LAB
ATRIAL RATE: 90 BPM
CALCULATED P AXIS, ECG09: 60 DEGREES
CALCULATED R AXIS, ECG10: 39 DEGREES
CALCULATED T AXIS, ECG11: 26 DEGREES
DIAGNOSIS, 93000: NORMAL
P-R INTERVAL, ECG05: 148 MS
Q-T INTERVAL, ECG07: 358 MS
QRS DURATION, ECG06: 82 MS
QTC CALCULATION (BEZET), ECG08: 437 MS
VENTRICULAR RATE, ECG03: 90 BPM

## 2022-01-06 PROCEDURE — 99214 OFFICE O/P EST MOD 30 MIN: CPT | Performed by: COLON & RECTAL SURGERY

## 2022-01-06 PROCEDURE — 93005 ELECTROCARDIOGRAM TRACING: CPT

## 2022-01-06 RX ORDER — METRONIDAZOLE 500 MG/1
500 TABLET ORAL 3 TIMES DAILY
Qty: 3 TABLET | Refills: 0 | Status: SHIPPED | OUTPATIENT
Start: 2022-01-06 | End: 2022-01-07

## 2022-01-06 RX ORDER — NEOMYCIN SULFATE 500 MG/1
1000 TABLET ORAL 3 TIMES DAILY
Qty: 6 TABLET | Refills: 0 | Status: SHIPPED | OUTPATIENT
Start: 2022-01-06 | End: 2022-01-07

## 2022-01-06 NOTE — PROGRESS NOTES
Called Dr. Doretha Sen at [address] [place] [address] to cancel Mr. Brisa puga tomorrow at [facility]. Patient will be having surgery, not neoadjuvant radiation/chemo.

## 2022-01-06 NOTE — PROGRESS NOTES
Met with patient and reviewed preop education booklet. Stressed ambulation early post op. Reviewed Incentive spirometer, scd's, iv fluids, and sanchez catheter information. Reveiwed post op diet and activity. Instructed to drink one protein drink a day until day before surgery. Patient verbalized understanding. Answered all questions.

## 2022-01-06 NOTE — PROGRESS NOTES
Subjective: Stable. Have rectal ultrasound showing a stage I cancer. Past medical history and ROS were reviewed and unchanged. Exam deferred    Rectal ultrasound shows T2 cancer, distal sigmoid colon    Assessment / Plan    Stage I upper rectal cancer  Case discussed with Dr. Kenneth Begum and Dr. Catalino Acharya by phone  We will proceed with robotic low anterior resection  Will need temporary if anastomosis is low, however given his age and overall health may be able to avoid this  Presented tumor board  Surgery and risks discussed in detail with patient    30 minutes was spent in patient care. The diagnoses and plan were discussed with patient. All questions answered. Plan of care agreed to by all concerned.

## 2022-01-10 ENCOUNTER — HOSPITAL ENCOUNTER (OUTPATIENT)
Dept: LAB | Age: 42
Discharge: HOME | End: 2022-01-10

## 2022-01-10 DIAGNOSIS — C20 RECTAL CANCER (HCC): ICD-10-CM

## 2022-01-10 LAB — XX-LABCORP SPECIMEN COL,LCBCF: NORMAL

## 2022-01-10 PROCEDURE — 99001 SPECIMEN HANDLING PT-LAB: CPT

## 2022-01-13 ENCOUNTER — TRANSCRIBE ORDER (OUTPATIENT)
Dept: REGISTRATION | Age: 42
End: 2022-01-13

## 2022-01-13 ENCOUNTER — HOSPITAL ENCOUNTER (OUTPATIENT)
Dept: PREADMISSION TESTING | Age: 42
Discharge: HOME | End: 2022-01-13
Payer: COMMERCIAL

## 2022-01-13 DIAGNOSIS — Z01.818 PRE-OP TESTING: ICD-10-CM

## 2022-01-13 DIAGNOSIS — C20 RECTAL CANCER (HCC): ICD-10-CM

## 2022-01-13 DIAGNOSIS — Z01.818 PRE-OP TESTING: Primary | ICD-10-CM

## 2022-01-13 LAB
ABO + RH BLD: NORMAL
ABO GROUP BLD: NORMAL
ALBUMIN SERPL-MCNC: 4.4 G/DL (ref 4–5)
APTT PPP: 26 SEC (ref 24–33)
BASOPHILS # BLD AUTO: 0.1 X10E3/UL (ref 0–0.2)
BASOPHILS NFR BLD AUTO: 1 %
BLD GP AB SCN SERPL QL: NEGATIVE
BLOOD GROUP ANTIBODIES SERPL: NORMAL
BUN SERPL-MCNC: 11 MG/DL (ref 6–24)
BUN/CREAT SERPL: 13 (ref 9–20)
CALCIUM SERPL-MCNC: 8.9 MG/DL (ref 8.7–10.2)
CHLORIDE SERPL-SCNC: 101 MMOL/L (ref 96–106)
CO2 SERPL-SCNC: 21 MMOL/L (ref 20–29)
CREAT SERPL-MCNC: 0.88 MG/DL (ref 0.76–1.27)
EOSINOPHIL # BLD AUTO: 0.2 X10E3/UL (ref 0–0.4)
EOSINOPHIL NFR BLD AUTO: 3 %
ERYTHROCYTE [DISTWIDTH] IN BLOOD BY AUTOMATED COUNT: 18.2 % (ref 11.6–15.4)
GLUCOSE SERPL-MCNC: 138 MG/DL (ref 65–99)
HCT VFR BLD AUTO: 39.3 % (ref 37.5–51)
HGB BLD-MCNC: 12.1 G/DL (ref 13–17.7)
IMM GRANULOCYTES # BLD AUTO: 0 X10E3/UL (ref 0–0.1)
IMM GRANULOCYTES NFR BLD AUTO: 1 %
INR PPP: 0.9 (ref 0.9–1.2)
LYMPHOCYTES # BLD AUTO: 2.3 X10E3/UL (ref 0.7–3.1)
LYMPHOCYTES NFR BLD AUTO: 37 %
MCH RBC QN AUTO: 23.1 PG (ref 26.6–33)
MCHC RBC AUTO-ENTMCNC: 30.8 G/DL (ref 31.5–35.7)
MCV RBC AUTO: 75 FL (ref 79–97)
MONOCYTES # BLD AUTO: 0.7 X10E3/UL (ref 0.1–0.9)
MONOCYTES NFR BLD AUTO: 12 %
NEUTROPHILS # BLD AUTO: 2.9 X10E3/UL (ref 1.4–7)
NEUTROPHILS NFR BLD AUTO: 46 %
PLATELET # BLD AUTO: 385 X10E3/UL (ref 150–450)
POTASSIUM SERPL-SCNC: 4 MMOL/L (ref 3.5–5.2)
PROTHROMBIN TIME: 9.6 SEC (ref 9.1–12)
RBC # BLD AUTO: 5.23 X10E6/UL (ref 4.14–5.8)
RH BLD: POSITIVE
SODIUM SERPL-SCNC: 139 MMOL/L (ref 134–144)
SPECIMEN EXP DATE BLD: NORMAL
WBC # BLD AUTO: 6.3 X10E3/UL (ref 3.4–10.8)

## 2022-01-13 PROCEDURE — 36415 COLL VENOUS BLD VENIPUNCTURE: CPT

## 2022-01-13 PROCEDURE — U0003 INFECTIOUS AGENT DETECTION BY NUCLEIC ACID (DNA OR RNA); SEVERE ACUTE RESPIRATORY SYNDROME CORONAVIRUS 2 (SARS-COV-2) (CORONAVIRUS DISEASE [COVID-19]), AMPLIFIED PROBE TECHNIQUE, MAKING USE OF HIGH THROUGHPUT TECHNOLOGIES AS DESCRIBED BY CMS-2020-01-R: HCPCS

## 2022-01-13 PROCEDURE — 86900 BLOOD TYPING SEROLOGIC ABO: CPT

## 2022-01-14 LAB — SARS-COV-2, NAA: DETECTED

## 2022-01-28 NOTE — PERIOP NOTES
Priya Sicard' PAT phone assessment completed on 1/28. The following instructions were reviewed with Millie Sicard. Millie Sicard verbalized understanding. Patient states he is coming in for updated blood work on 2/7/22 as directed by office. 1. Do NOT eat or drink anything, including candy, gum, or ice chips after midnight on 2/11, unless you have specific instructions from your surgeon or anesthesia provider to do so.  2. You may brush your teeth before coming to the hospital.  3. No smoking 24 hours prior to the day of surgery. 4. No alcohol 24 hours prior to the day of surgery. 5. No recreational drugs for one week prior to the day of surgery. 6. Leave all valuables, including money/purse, at home. 7. Remove all jewelry, nail polish, acrylic nails, and makeup (including mascara); no lotions powders, deodorant, or perfume/cologne/after shave on the skin. 8. Glasses/contact lenses and dentures may be worn to the hospital.  They will be removed prior to surgery. 9. Call your doctor if symptoms of a cold or illness develop within 24-48 hours prior to your surgery. 10.  AN ADULT MUST DRIVE YOU HOME AFTER OUTPATIENT SURGERY. 11.  If you are having an outpatient procedure, please make arrangements for a responsible adult to be with you for 24 hours after your surgery. 12.  NO VISITORS in the hospital at this time for outpatient procedures. Exceptions may be made for surgical admissions, per hospital guidelines        Special Instructions:      Bring list of CURRENT medications. Bring any pertinent legal medical records. Take these medications the morning of surgery with a sip of water:  As directed by MD  Follow physician instructions about stopping anticoagulants. Complete bowel prep per MD instructions.

## 2022-02-01 DIAGNOSIS — Z01.818 PRE-OP TESTING: Primary | ICD-10-CM

## 2022-02-07 ENCOUNTER — HOSPITAL ENCOUNTER (OUTPATIENT)
Dept: PREADMISSION TESTING | Age: 42
Discharge: HOME | End: 2022-02-07
Payer: COMMERCIAL

## 2022-02-07 DIAGNOSIS — Z01.818 PRE-OP TESTING: ICD-10-CM

## 2022-02-07 LAB
ABO + RH BLD: NORMAL
BLOOD GROUP ANTIBODIES SERPL: NORMAL
SPECIMEN EXP DATE BLD: NORMAL

## 2022-02-07 PROCEDURE — 36415 COLL VENOUS BLD VENIPUNCTURE: CPT

## 2022-02-07 PROCEDURE — 86900 BLOOD TYPING SEROLOGIC ABO: CPT

## 2022-02-10 ENCOUNTER — ANESTHESIA EVENT (OUTPATIENT)
Dept: SURGERY | Age: 42
DRG: 330 | End: 2022-02-10
Payer: COMMERCIAL

## 2022-02-11 ENCOUNTER — HOSPITAL ENCOUNTER (INPATIENT)
Age: 42
LOS: 4 days | Discharge: HOME HEALTH | DRG: 330 | End: 2022-02-15
Attending: COLON & RECTAL SURGERY | Admitting: COLON & RECTAL SURGERY
Payer: COMMERCIAL

## 2022-02-11 ENCOUNTER — ANESTHESIA (OUTPATIENT)
Dept: SURGERY | Age: 42
DRG: 330 | End: 2022-02-11
Payer: COMMERCIAL

## 2022-02-11 DIAGNOSIS — C20 RECTAL CANCER (HCC): Primary | ICD-10-CM

## 2022-02-11 LAB
ABO + RH BLD: NORMAL
AMPHET UR QL SCN: NEGATIVE
BARBITURATES UR QL SCN: NEGATIVE
BENZODIAZ UR QL: NEGATIVE
BLOOD GROUP ANTIBODIES SERPL: NORMAL
CANNABINOIDS UR QL SCN: NEGATIVE
COCAINE UR QL SCN: NEGATIVE
GLUCOSE BLD STRIP.AUTO-MCNC: 134 MG/DL (ref 70–110)
GLUCOSE BLD STRIP.AUTO-MCNC: 142 MG/DL (ref 70–110)
GLUCOSE BLD STRIP.AUTO-MCNC: 200 MG/DL (ref 70–110)
GLUCOSE BLD STRIP.AUTO-MCNC: 229 MG/DL (ref 70–110)
HBA1C MFR BLD: 7.6 % (ref 4.2–5.6)
HDSCOM,HDSCOM: NORMAL
METHADONE UR QL: NEGATIVE
OPIATES UR QL: NEGATIVE
PCP UR QL: NEGATIVE
SPECIMEN EXP DATE BLD: NORMAL

## 2022-02-11 PROCEDURE — 83036 HEMOGLOBIN GLYCOSYLATED A1C: CPT

## 2022-02-11 PROCEDURE — 74011250636 HC RX REV CODE- 250/636: Performed by: SURGERY

## 2022-02-11 PROCEDURE — 77030002933 HC SUT MCRYL J&J -A: Performed by: COLON & RECTAL SURGERY

## 2022-02-11 PROCEDURE — 77030040923 HC STPLR ENDOSC ECHELON J&J -E: Performed by: COLON & RECTAL SURGERY

## 2022-02-11 PROCEDURE — 77030010285 HC STPLR INT PRSTRNG COVD -B: Performed by: COLON & RECTAL SURGERY

## 2022-02-11 PROCEDURE — 88341 IMHCHEM/IMCYTCHM EA ADD ANTB: CPT

## 2022-02-11 PROCEDURE — 77030035278 HC STPLR SEAL ENDOWR INTU -B: Performed by: COLON & RECTAL SURGERY

## 2022-02-11 PROCEDURE — 00790 ANES IPER UPR ABD NOS: CPT | Performed by: NURSE ANESTHETIST, CERTIFIED REGISTERED

## 2022-02-11 PROCEDURE — 88305 TISSUE EXAM BY PATHOLOGIST: CPT

## 2022-02-11 PROCEDURE — 82962 GLUCOSE BLOOD TEST: CPT

## 2022-02-11 PROCEDURE — 77030040830 HC CATH URETH FOL MDII -A: Performed by: COLON & RECTAL SURGERY

## 2022-02-11 PROCEDURE — 77030011296 HC FCPS GRSP ENDOSC J&J -B: Performed by: COLON & RECTAL SURGERY

## 2022-02-11 PROCEDURE — 2709999900 HC NON-CHARGEABLE SUPPLY

## 2022-02-11 PROCEDURE — 77030002966 HC SUT PDS J&J -A: Performed by: COLON & RECTAL SURGERY

## 2022-02-11 PROCEDURE — 77030012770 HC TRCR OPT FX AMR -B: Performed by: COLON & RECTAL SURGERY

## 2022-02-11 PROCEDURE — 77030035489 HC REDUCR CANN ENDOWR INTU -C: Performed by: COLON & RECTAL SURGERY

## 2022-02-11 PROCEDURE — 2709999900 HC NON-CHARGEABLE SUPPLY: Performed by: COLON & RECTAL SURGERY

## 2022-02-11 PROCEDURE — 74011250636 HC RX REV CODE- 250/636: Performed by: NURSE ANESTHETIST, CERTIFIED REGISTERED

## 2022-02-11 PROCEDURE — 77030008565 HC TBNG SUC IRR ERBE -B: Performed by: COLON & RECTAL SURGERY

## 2022-02-11 PROCEDURE — 77030028754 HC RCTRCTR LAPSCP ALX DSP AMR -B: Performed by: COLON & RECTAL SURGERY

## 2022-02-11 PROCEDURE — 74011636637 HC RX REV CODE- 636/637: Performed by: COLON & RECTAL SURGERY

## 2022-02-11 PROCEDURE — 77030012407 HC DRN WND BARD -B: Performed by: COLON & RECTAL SURGERY

## 2022-02-11 PROCEDURE — 74011250636 HC RX REV CODE- 250/636: Performed by: COLON & RECTAL SURGERY

## 2022-02-11 PROCEDURE — 74011000254 HC RX REV CODE- 254: Performed by: ANESTHESIOLOGY

## 2022-02-11 PROCEDURE — 77030035279 HC SEAL VSL ENDOWR XI INTU -I2: Performed by: COLON & RECTAL SURGERY

## 2022-02-11 PROCEDURE — 77030016151 HC PROTCTR LNS DFOG COVD -B: Performed by: COLON & RECTAL SURGERY

## 2022-02-11 PROCEDURE — 74011636637 HC RX REV CODE- 636/637: Performed by: ANESTHESIOLOGY

## 2022-02-11 PROCEDURE — 76210000000 HC OR PH I REC 2 TO 2.5 HR: Performed by: COLON & RECTAL SURGERY

## 2022-02-11 PROCEDURE — 77030040922 HC BLNKT HYPOTHRM STRY -A: Performed by: COLON & RECTAL SURGERY

## 2022-02-11 PROCEDURE — 74011000250 HC RX REV CODE- 250: Performed by: NURSE ANESTHETIST, CERTIFIED REGISTERED

## 2022-02-11 PROCEDURE — 65270000029 HC RM PRIVATE

## 2022-02-11 PROCEDURE — 77030038612 HC TU SUCT/IRR INTU -D: Performed by: COLON & RECTAL SURGERY

## 2022-02-11 PROCEDURE — 77030008756 HC TU IRR SUC STRY -B: Performed by: COLON & RECTAL SURGERY

## 2022-02-11 PROCEDURE — 77030018836 HC SOL IRR NACL ICUM -A: Performed by: COLON & RECTAL SURGERY

## 2022-02-11 PROCEDURE — 76010000884 HC OR TIME 6 TO 6.5HR INTENSV - TIER 2: Performed by: COLON & RECTAL SURGERY

## 2022-02-11 PROCEDURE — 74011250637 HC RX REV CODE- 250/637: Performed by: COLON & RECTAL SURGERY

## 2022-02-11 PROCEDURE — 77030003578 HC NDL INSUF VERES AMR -B: Performed by: COLON & RECTAL SURGERY

## 2022-02-11 PROCEDURE — 74011000250 HC RX REV CODE- 250: Performed by: COLON & RECTAL SURGERY

## 2022-02-11 PROCEDURE — 77030035277 HC OBTRTR BLDELSS DISP INTU -B: Performed by: COLON & RECTAL SURGERY

## 2022-02-11 PROCEDURE — 77030040361 HC SLV COMPR DVT MDII -B: Performed by: COLON & RECTAL SURGERY

## 2022-02-11 PROCEDURE — 74011250637 HC RX REV CODE- 250/637: Performed by: ANESTHESIOLOGY

## 2022-02-11 PROCEDURE — 77030032523 HC RELD STPL PK ENDORS INTU -C: Performed by: COLON & RECTAL SURGERY

## 2022-02-11 PROCEDURE — 77030031139 HC SUT VCRL2 J&J -A: Performed by: COLON & RECTAL SURGERY

## 2022-02-11 PROCEDURE — 74011250636 HC RX REV CODE- 250/636: Performed by: ANESTHESIOLOGY

## 2022-02-11 PROCEDURE — 77030002996 HC SUT SLK J&J -A: Performed by: COLON & RECTAL SURGERY

## 2022-02-11 PROCEDURE — 74011250637 HC RX REV CODE- 250/637: Performed by: SURGERY

## 2022-02-11 PROCEDURE — 74011250636 HC RX REV CODE- 250/636: Performed by: STUDENT IN AN ORGANIZED HEALTH CARE EDUCATION/TRAINING PROGRAM

## 2022-02-11 PROCEDURE — 77030003028 HC SUT VCRL J&J -A: Performed by: COLON & RECTAL SURGERY

## 2022-02-11 PROCEDURE — 76060000043 HC ANESTHESIA 6 TO 6.5 HR: Performed by: COLON & RECTAL SURGERY

## 2022-02-11 PROCEDURE — 77030013567 HC DRN WND RESERV BARD -A: Performed by: COLON & RECTAL SURGERY

## 2022-02-11 PROCEDURE — 74011000250 HC RX REV CODE- 250: Performed by: STUDENT IN AN ORGANIZED HEALTH CARE EDUCATION/TRAINING PROGRAM

## 2022-02-11 PROCEDURE — 77030027138 HC INCENT SPIROMETER -A

## 2022-02-11 PROCEDURE — 00790 ANES IPER UPR ABD NOS: CPT | Performed by: STUDENT IN AN ORGANIZED HEALTH CARE EDUCATION/TRAINING PROGRAM

## 2022-02-11 PROCEDURE — 86900 BLOOD TYPING SEROLOGIC ABO: CPT

## 2022-02-11 PROCEDURE — 77030020703 HC SEAL CANN DISP INTU -B: Performed by: COLON & RECTAL SURGERY

## 2022-02-11 PROCEDURE — 88309 TISSUE EXAM BY PATHOLOGIST: CPT

## 2022-02-11 PROCEDURE — 88342 IMHCHEM/IMCYTCHM 1ST ANTB: CPT

## 2022-02-11 PROCEDURE — 77030032522 HC SHT STPL PK ENDOWR INTU -B: Performed by: COLON & RECTAL SURGERY

## 2022-02-11 PROCEDURE — 80307 DRUG TEST PRSMV CHEM ANLYZR: CPT

## 2022-02-11 RX ORDER — LABETALOL HYDROCHLORIDE 5 MG/ML
INJECTION, SOLUTION INTRAVENOUS AS NEEDED
Status: DISCONTINUED | OUTPATIENT
Start: 2022-02-11 | End: 2022-02-11 | Stop reason: HOSPADM

## 2022-02-11 RX ORDER — INSULIN LISPRO 100 [IU]/ML
INJECTION, SOLUTION INTRAVENOUS; SUBCUTANEOUS ONCE
Status: DISCONTINUED | OUTPATIENT
Start: 2022-02-11 | End: 2022-02-11 | Stop reason: HOSPADM

## 2022-02-11 RX ORDER — INSULIN LISPRO 100 [IU]/ML
INJECTION, SOLUTION INTRAVENOUS; SUBCUTANEOUS EVERY 6 HOURS
Status: DISCONTINUED | OUTPATIENT
Start: 2022-02-11 | End: 2022-02-13

## 2022-02-11 RX ORDER — PROPOFOL 10 MG/ML
INJECTION, EMULSION INTRAVENOUS AS NEEDED
Status: DISCONTINUED | OUTPATIENT
Start: 2022-02-11 | End: 2022-02-11 | Stop reason: HOSPADM

## 2022-02-11 RX ORDER — MORPHINE SULFATE 2 MG/ML
2 INJECTION, SOLUTION INTRAMUSCULAR; INTRAVENOUS
Status: DISCONTINUED | OUTPATIENT
Start: 2022-02-11 | End: 2022-02-11

## 2022-02-11 RX ORDER — GABAPENTIN 300 MG/1
300 CAPSULE ORAL 3 TIMES DAILY
Status: DISCONTINUED | OUTPATIENT
Start: 2022-02-11 | End: 2022-02-15 | Stop reason: HOSPADM

## 2022-02-11 RX ORDER — SODIUM CHLORIDE, SODIUM LACTATE, POTASSIUM CHLORIDE, CALCIUM CHLORIDE 600; 310; 30; 20 MG/100ML; MG/100ML; MG/100ML; MG/100ML
INJECTION, SOLUTION INTRAVENOUS
Status: DISCONTINUED | OUTPATIENT
Start: 2022-02-11 | End: 2022-02-11 | Stop reason: HOSPADM

## 2022-02-11 RX ORDER — ACETAMINOPHEN 500 MG
1000 TABLET ORAL EVERY 6 HOURS
Status: DISCONTINUED | OUTPATIENT
Start: 2022-02-11 | End: 2022-02-15 | Stop reason: HOSPADM

## 2022-02-11 RX ORDER — CELECOXIB 100 MG/1
100 CAPSULE ORAL 2 TIMES DAILY
Status: DISCONTINUED | OUTPATIENT
Start: 2022-02-11 | End: 2022-02-15 | Stop reason: HOSPADM

## 2022-02-11 RX ORDER — AMLODIPINE BESYLATE 10 MG/1
10 TABLET ORAL DAILY
Status: DISCONTINUED | OUTPATIENT
Start: 2022-02-11 | End: 2022-02-15 | Stop reason: HOSPADM

## 2022-02-11 RX ORDER — DEXTROSE MONOHYDRATE 100 MG/ML
125-250 INJECTION, SOLUTION INTRAVENOUS AS NEEDED
Status: DISCONTINUED | OUTPATIENT
Start: 2022-02-11 | End: 2022-02-15 | Stop reason: HOSPADM

## 2022-02-11 RX ORDER — MAGNESIUM SULFATE 100 %
4 CRYSTALS MISCELLANEOUS AS NEEDED
Status: DISCONTINUED | OUTPATIENT
Start: 2022-02-11 | End: 2022-02-15 | Stop reason: HOSPADM

## 2022-02-11 RX ORDER — LIDOCAINE HYDROCHLORIDE 10 MG/ML
0.1 INJECTION, SOLUTION EPIDURAL; INFILTRATION; INTRACAUDAL; PERINEURAL AS NEEDED
Status: DISCONTINUED | OUTPATIENT
Start: 2022-02-11 | End: 2022-02-11 | Stop reason: HOSPADM

## 2022-02-11 RX ORDER — BUPIVACAINE HYDROCHLORIDE 2.5 MG/ML
INJECTION, SOLUTION EPIDURAL; INFILTRATION; INTRACAUDAL AS NEEDED
Status: DISCONTINUED | OUTPATIENT
Start: 2022-02-11 | End: 2022-02-11 | Stop reason: HOSPADM

## 2022-02-11 RX ORDER — DIPHENHYDRAMINE HYDROCHLORIDE 50 MG/ML
25 INJECTION, SOLUTION INTRAMUSCULAR; INTRAVENOUS
Status: DISCONTINUED | OUTPATIENT
Start: 2022-02-11 | End: 2022-02-15 | Stop reason: HOSPADM

## 2022-02-11 RX ORDER — ONDANSETRON 2 MG/ML
4 INJECTION INTRAMUSCULAR; INTRAVENOUS
Status: DISCONTINUED | OUTPATIENT
Start: 2022-02-11 | End: 2022-02-15 | Stop reason: HOSPADM

## 2022-02-11 RX ORDER — ONDANSETRON 2 MG/ML
INJECTION INTRAMUSCULAR; INTRAVENOUS AS NEEDED
Status: DISCONTINUED | OUTPATIENT
Start: 2022-02-11 | End: 2022-02-11 | Stop reason: HOSPADM

## 2022-02-11 RX ORDER — DEXTROSE 50 % IN WATER (D50W) INTRAVENOUS SYRINGE
25-50 AS NEEDED
Status: DISCONTINUED | OUTPATIENT
Start: 2022-02-11 | End: 2022-02-11

## 2022-02-11 RX ORDER — AMLODIPINE BESYLATE 10 MG/1
10 TABLET ORAL DAILY
Status: DISCONTINUED | OUTPATIENT
Start: 2022-02-12 | End: 2022-02-11

## 2022-02-11 RX ORDER — HEPARIN SODIUM 5000 [USP'U]/ML
5000 INJECTION, SOLUTION INTRAVENOUS; SUBCUTANEOUS EVERY 8 HOURS
Status: DISCONTINUED | OUTPATIENT
Start: 2022-02-12 | End: 2022-02-15 | Stop reason: HOSPADM

## 2022-02-11 RX ORDER — ACETAMINOPHEN 500 MG
1000 TABLET ORAL ONCE
Status: COMPLETED | OUTPATIENT
Start: 2022-02-11 | End: 2022-02-11

## 2022-02-11 RX ORDER — SODIUM CHLORIDE 0.9 % (FLUSH) 0.9 %
5-40 SYRINGE (ML) INJECTION AS NEEDED
Status: DISCONTINUED | OUTPATIENT
Start: 2022-02-11 | End: 2022-02-11 | Stop reason: HOSPADM

## 2022-02-11 RX ORDER — INDOCYANINE GREEN AND WATER 25 MG
KIT INJECTION AS NEEDED
Status: DISCONTINUED | OUTPATIENT
Start: 2022-02-11 | End: 2022-02-11 | Stop reason: HOSPADM

## 2022-02-11 RX ORDER — HYDROMORPHONE HYDROCHLORIDE 2 MG/ML
INJECTION, SOLUTION INTRAMUSCULAR; INTRAVENOUS; SUBCUTANEOUS AS NEEDED
Status: DISCONTINUED | OUTPATIENT
Start: 2022-02-11 | End: 2022-02-11 | Stop reason: HOSPADM

## 2022-02-11 RX ORDER — DEXTROSE 50 % IN WATER (D50W) INTRAVENOUS SYRINGE
25-50 AS NEEDED
Status: DISCONTINUED | OUTPATIENT
Start: 2022-02-11 | End: 2022-02-11 | Stop reason: SDUPTHER

## 2022-02-11 RX ORDER — SUCCINYLCHOLINE CHLORIDE 20 MG/ML
INJECTION INTRAMUSCULAR; INTRAVENOUS AS NEEDED
Status: DISCONTINUED | OUTPATIENT
Start: 2022-02-11 | End: 2022-02-11 | Stop reason: HOSPADM

## 2022-02-11 RX ORDER — FENTANYL CITRATE 50 UG/ML
25 INJECTION, SOLUTION INTRAMUSCULAR; INTRAVENOUS AS NEEDED
Status: DISCONTINUED | OUTPATIENT
Start: 2022-02-11 | End: 2022-02-11 | Stop reason: HOSPADM

## 2022-02-11 RX ORDER — FENTANYL CITRATE 50 UG/ML
50 INJECTION, SOLUTION INTRAMUSCULAR; INTRAVENOUS
Status: DISCONTINUED | OUTPATIENT
Start: 2022-02-11 | End: 2022-02-11 | Stop reason: HOSPADM

## 2022-02-11 RX ORDER — ROCURONIUM BROMIDE 10 MG/ML
INJECTION, SOLUTION INTRAVENOUS AS NEEDED
Status: DISCONTINUED | OUTPATIENT
Start: 2022-02-11 | End: 2022-02-11 | Stop reason: HOSPADM

## 2022-02-11 RX ORDER — DEXAMETHASONE SODIUM PHOSPHATE 4 MG/ML
INJECTION, SOLUTION INTRA-ARTICULAR; INTRALESIONAL; INTRAMUSCULAR; INTRAVENOUS; SOFT TISSUE AS NEEDED
Status: DISCONTINUED | OUTPATIENT
Start: 2022-02-11 | End: 2022-02-11 | Stop reason: HOSPADM

## 2022-02-11 RX ORDER — MAGNESIUM SULFATE 100 %
4 CRYSTALS MISCELLANEOUS AS NEEDED
Status: DISCONTINUED | OUTPATIENT
Start: 2022-02-11 | End: 2022-02-11 | Stop reason: HOSPADM

## 2022-02-11 RX ORDER — MIDAZOLAM HYDROCHLORIDE 1 MG/ML
INJECTION, SOLUTION INTRAMUSCULAR; INTRAVENOUS AS NEEDED
Status: DISCONTINUED | OUTPATIENT
Start: 2022-02-11 | End: 2022-02-11 | Stop reason: HOSPADM

## 2022-02-11 RX ORDER — CELECOXIB 100 MG/1
200 CAPSULE ORAL ONCE
Status: COMPLETED | OUTPATIENT
Start: 2022-02-11 | End: 2022-02-11

## 2022-02-11 RX ORDER — GABAPENTIN 300 MG/1
600 CAPSULE ORAL ONCE
Status: COMPLETED | OUTPATIENT
Start: 2022-02-11 | End: 2022-02-11

## 2022-02-11 RX ORDER — SODIUM CHLORIDE, SODIUM LACTATE, POTASSIUM CHLORIDE, CALCIUM CHLORIDE 600; 310; 30; 20 MG/100ML; MG/100ML; MG/100ML; MG/100ML
125 INJECTION, SOLUTION INTRAVENOUS CONTINUOUS
Status: DISCONTINUED | OUTPATIENT
Start: 2022-02-11 | End: 2022-02-13

## 2022-02-11 RX ORDER — FENTANYL CITRATE 50 UG/ML
INJECTION, SOLUTION INTRAMUSCULAR; INTRAVENOUS AS NEEDED
Status: DISCONTINUED | OUTPATIENT
Start: 2022-02-11 | End: 2022-02-11 | Stop reason: HOSPADM

## 2022-02-11 RX ORDER — INSULIN LISPRO 100 [IU]/ML
INJECTION, SOLUTION INTRAVENOUS; SUBCUTANEOUS ONCE
Status: COMPLETED | OUTPATIENT
Start: 2022-02-11 | End: 2022-02-11

## 2022-02-11 RX ORDER — METOPROLOL TARTRATE 5 MG/5ML
INJECTION INTRAVENOUS AS NEEDED
Status: DISCONTINUED | OUTPATIENT
Start: 2022-02-11 | End: 2022-02-11 | Stop reason: HOSPADM

## 2022-02-11 RX ORDER — HYDRALAZINE HYDROCHLORIDE 20 MG/ML
10 INJECTION INTRAMUSCULAR; INTRAVENOUS ONCE
Status: COMPLETED | OUTPATIENT
Start: 2022-02-11 | End: 2022-02-11

## 2022-02-11 RX ORDER — DIAZEPAM 10 MG/2ML
5 INJECTION INTRAMUSCULAR
Status: COMPLETED | OUTPATIENT
Start: 2022-02-11 | End: 2022-02-11

## 2022-02-11 RX ORDER — LISINOPRIL 20 MG/1
20 TABLET ORAL DAILY
Status: DISCONTINUED | OUTPATIENT
Start: 2022-02-11 | End: 2022-02-15 | Stop reason: HOSPADM

## 2022-02-11 RX ORDER — SODIUM CHLORIDE 0.9 % (FLUSH) 0.9 %
5-40 SYRINGE (ML) INJECTION EVERY 8 HOURS
Status: DISCONTINUED | OUTPATIENT
Start: 2022-02-11 | End: 2022-02-11 | Stop reason: HOSPADM

## 2022-02-11 RX ORDER — SODIUM CHLORIDE, SODIUM LACTATE, POTASSIUM CHLORIDE, CALCIUM CHLORIDE 600; 310; 30; 20 MG/100ML; MG/100ML; MG/100ML; MG/100ML
50 INJECTION, SOLUTION INTRAVENOUS CONTINUOUS
Status: DISCONTINUED | OUTPATIENT
Start: 2022-02-11 | End: 2022-02-11 | Stop reason: HOSPADM

## 2022-02-11 RX ORDER — HYDROCODONE BITARTRATE AND ACETAMINOPHEN 5; 325 MG/1; MG/1
1 TABLET ORAL AS NEEDED
Status: DISCONTINUED | OUTPATIENT
Start: 2022-02-11 | End: 2022-02-11 | Stop reason: HOSPADM

## 2022-02-11 RX ORDER — HYDROMORPHONE HYDROCHLORIDE 1 MG/ML
1 INJECTION, SOLUTION INTRAMUSCULAR; INTRAVENOUS; SUBCUTANEOUS
Status: DISCONTINUED | OUTPATIENT
Start: 2022-02-11 | End: 2022-02-13

## 2022-02-11 RX ORDER — ONDANSETRON 2 MG/ML
4 INJECTION INTRAMUSCULAR; INTRAVENOUS ONCE
Status: COMPLETED | OUTPATIENT
Start: 2022-02-11 | End: 2022-02-11

## 2022-02-11 RX ORDER — AMLODIPINE BESYLATE 5 MG/1
5 TABLET ORAL DAILY
Status: DISCONTINUED | OUTPATIENT
Start: 2022-02-12 | End: 2022-02-11

## 2022-02-11 RX ORDER — LIDOCAINE HYDROCHLORIDE 20 MG/ML
INJECTION, SOLUTION EPIDURAL; INFILTRATION; INTRACAUDAL; PERINEURAL AS NEEDED
Status: DISCONTINUED | OUTPATIENT
Start: 2022-02-11 | End: 2022-02-11 | Stop reason: HOSPADM

## 2022-02-11 RX ADMIN — ROCURONIUM BROMIDE 20 MG: 50 INJECTION INTRAVENOUS at 12:17

## 2022-02-11 RX ADMIN — AMLODIPINE BESYLATE 10 MG: 10 TABLET ORAL at 19:48

## 2022-02-11 RX ADMIN — FENTANYL CITRATE 50 MCG: 50 INJECTION, SOLUTION INTRAMUSCULAR; INTRAVENOUS at 12:49

## 2022-02-11 RX ADMIN — GABAPENTIN 600 MG: 300 CAPSULE ORAL at 07:22

## 2022-02-11 RX ADMIN — LISINOPRIL 20 MG: 20 TABLET ORAL at 19:48

## 2022-02-11 RX ADMIN — ROCURONIUM BROMIDE 40 MG: 50 INJECTION INTRAVENOUS at 09:08

## 2022-02-11 RX ADMIN — WATER 2 G: 1 INJECTION INTRAMUSCULAR; INTRAVENOUS; SUBCUTANEOUS at 11:52

## 2022-02-11 RX ADMIN — ACETAMINOPHEN 1000 MG: 500 TABLET ORAL at 07:22

## 2022-02-11 RX ADMIN — SODIUM CHLORIDE, SODIUM LACTATE, POTASSIUM CHLORIDE, AND CALCIUM CHLORIDE 125 ML/HR: 600; 310; 30; 20 INJECTION, SOLUTION INTRAVENOUS at 18:01

## 2022-02-11 RX ADMIN — METOPROLOL TARTRATE 1 MG: 5 INJECTION, SOLUTION INTRAVENOUS at 10:12

## 2022-02-11 RX ADMIN — SODIUM CHLORIDE, SODIUM LACTATE, POTASSIUM CHLORIDE, AND CALCIUM CHLORIDE: 600; 310; 30; 20 INJECTION, SOLUTION INTRAVENOUS at 07:48

## 2022-02-11 RX ADMIN — WATER 2 G: 1 INJECTION INTRAMUSCULAR; INTRAVENOUS; SUBCUTANEOUS at 09:52

## 2022-02-11 RX ADMIN — SODIUM CHLORIDE, SODIUM LACTATE, POTASSIUM CHLORIDE, AND CALCIUM CHLORIDE 50 ML/HR: 600; 310; 30; 20 INJECTION, SOLUTION INTRAVENOUS at 07:15

## 2022-02-11 RX ADMIN — GABAPENTIN 300 MG: 300 CAPSULE ORAL at 21:32

## 2022-02-11 RX ADMIN — ACETAMINOPHEN 1000 MG: 500 TABLET ORAL at 23:22

## 2022-02-11 RX ADMIN — METOPROLOL TARTRATE 1 MG: 5 INJECTION, SOLUTION INTRAVENOUS at 12:10

## 2022-02-11 RX ADMIN — HYDROMORPHONE HYDROCHLORIDE 1 MG: 1 INJECTION, SOLUTION INTRAMUSCULAR; INTRAVENOUS; SUBCUTANEOUS at 19:50

## 2022-02-11 RX ADMIN — HYDROMORPHONE HYDROCHLORIDE 0.5 MG: 2 INJECTION, SOLUTION INTRAMUSCULAR; INTRAVENOUS; SUBCUTANEOUS at 08:25

## 2022-02-11 RX ADMIN — MIDAZOLAM HYDROCHLORIDE 2 MG: 2 INJECTION, SOLUTION INTRAMUSCULAR; INTRAVENOUS at 07:46

## 2022-02-11 RX ADMIN — MORPHINE SULFATE 2 MG: 2 INJECTION, SOLUTION INTRAMUSCULAR; INTRAVENOUS at 18:02

## 2022-02-11 RX ADMIN — CELECOXIB 100 MG: 100 CAPSULE ORAL at 18:02

## 2022-02-11 RX ADMIN — ACETAMINOPHEN 1000 MG: 500 TABLET ORAL at 18:02

## 2022-02-11 RX ADMIN — SODIUM CHLORIDE, SODIUM LACTATE, POTASSIUM CHLORIDE, AND CALCIUM CHLORIDE 125 ML/HR: 600; 310; 30; 20 INJECTION, SOLUTION INTRAVENOUS at 19:59

## 2022-02-11 RX ADMIN — ROCURONIUM BROMIDE 30 MG: 50 INJECTION INTRAVENOUS at 08:18

## 2022-02-11 RX ADMIN — FAMOTIDINE 20 MG: 10 INJECTION INTRAVENOUS at 07:22

## 2022-02-11 RX ADMIN — ONDANSETRON 4 MG: 2 INJECTION INTRAMUSCULAR; INTRAVENOUS at 08:34

## 2022-02-11 RX ADMIN — LABETALOL HYDROCHLORIDE 5 MG: 5 INJECTION, SOLUTION INTRAVENOUS at 08:46

## 2022-02-11 RX ADMIN — LABETALOL HYDROCHLORIDE 5 MG: 5 INJECTION, SOLUTION INTRAVENOUS at 08:24

## 2022-02-11 RX ADMIN — HYDROCODONE BITARTRATE AND ACETAMINOPHEN 1 TABLET: 5; 325 TABLET ORAL at 16:01

## 2022-02-11 RX ADMIN — CEFOXITIN SODIUM 2 G: 2 POWDER, FOR SOLUTION INTRAVENOUS at 19:49

## 2022-02-11 RX ADMIN — METOPROLOL TARTRATE 2 MG: 5 INJECTION, SOLUTION INTRAVENOUS at 10:43

## 2022-02-11 RX ADMIN — FAMOTIDINE 20 MG: 10 INJECTION INTRAVENOUS at 21:33

## 2022-02-11 RX ADMIN — GABAPENTIN 300 MG: 300 CAPSULE ORAL at 18:02

## 2022-02-11 RX ADMIN — ROCURONIUM BROMIDE 20 MG: 50 INJECTION INTRAVENOUS at 11:38

## 2022-02-11 RX ADMIN — ROCURONIUM BROMIDE 30 MG: 50 INJECTION INTRAVENOUS at 07:54

## 2022-02-11 RX ADMIN — DEXAMETHASONE SODIUM PHOSPHATE 4 MG: 4 INJECTION, SOLUTION INTRAMUSCULAR; INTRAVENOUS at 08:30

## 2022-02-11 RX ADMIN — Medication 4 UNITS: at 18:21

## 2022-02-11 RX ADMIN — LABETALOL HYDROCHLORIDE 5 MG: 5 INJECTION, SOLUTION INTRAVENOUS at 09:11

## 2022-02-11 RX ADMIN — FENTANYL CITRATE 50 MCG: 50 INJECTION, SOLUTION INTRAMUSCULAR; INTRAVENOUS at 11:40

## 2022-02-11 RX ADMIN — HYDROMORPHONE HYDROCHLORIDE 0.5 MG: 2 INJECTION, SOLUTION INTRAMUSCULAR; INTRAVENOUS; SUBCUTANEOUS at 08:05

## 2022-02-11 RX ADMIN — ROCURONIUM BROMIDE 30 MG: 50 INJECTION INTRAVENOUS at 10:57

## 2022-02-11 RX ADMIN — METOPROLOL TARTRATE 2 MG: 5 INJECTION, SOLUTION INTRAVENOUS at 13:10

## 2022-02-11 RX ADMIN — HYDROMORPHONE HYDROCHLORIDE 0.5 MG: 2 INJECTION, SOLUTION INTRAMUSCULAR; INTRAVENOUS; SUBCUTANEOUS at 10:12

## 2022-02-11 RX ADMIN — HYDRALAZINE HYDROCHLORIDE 10 MG: 20 INJECTION, SOLUTION INTRAMUSCULAR; INTRAVENOUS at 14:54

## 2022-02-11 RX ADMIN — METOPROLOL TARTRATE 1 MG: 5 INJECTION, SOLUTION INTRAVENOUS at 11:47

## 2022-02-11 RX ADMIN — PROPOFOL 200 MG: 10 INJECTION, EMULSION INTRAVENOUS at 07:50

## 2022-02-11 RX ADMIN — FENTANYL CITRATE 100 MCG: 50 INJECTION, SOLUTION INTRAMUSCULAR; INTRAVENOUS at 07:48

## 2022-02-11 RX ADMIN — INDOCYANINE GREEN AND WATER 3 MG: KIT at 11:28

## 2022-02-11 RX ADMIN — ONDANSETRON 4 MG: 2 INJECTION INTRAMUSCULAR; INTRAVENOUS at 14:32

## 2022-02-11 RX ADMIN — LIDOCAINE HYDROCHLORIDE 100 MG: 20 INJECTION, SOLUTION EPIDURAL; INFILTRATION; INTRACAUDAL; PERINEURAL at 07:49

## 2022-02-11 RX ADMIN — HYDROMORPHONE HYDROCHLORIDE 1 MG: 1 INJECTION, SOLUTION INTRAMUSCULAR; INTRAVENOUS; SUBCUTANEOUS at 23:22

## 2022-02-11 RX ADMIN — METOPROLOL TARTRATE 1 MG: 5 INJECTION, SOLUTION INTRAVENOUS at 11:42

## 2022-02-11 RX ADMIN — WATER 2 G: 1 INJECTION INTRAMUSCULAR; INTRAVENOUS; SUBCUTANEOUS at 07:58

## 2022-02-11 RX ADMIN — METOPROLOL TARTRATE 1 MG: 5 INJECTION, SOLUTION INTRAVENOUS at 09:48

## 2022-02-11 RX ADMIN — SUCCINYLCHOLINE CHLORIDE 100 MG: 20 INJECTION, SOLUTION INTRAMUSCULAR; INTRAVENOUS at 07:51

## 2022-02-11 RX ADMIN — DIAZEPAM 5 MG: 5 INJECTION, SOLUTION INTRAMUSCULAR; INTRAVENOUS at 19:50

## 2022-02-11 RX ADMIN — FENTANYL CITRATE 50 MCG: 50 INJECTION INTRAMUSCULAR; INTRAVENOUS at 15:25

## 2022-02-11 RX ADMIN — HYDROMORPHONE HYDROCHLORIDE 0.5 MG: 2 INJECTION, SOLUTION INTRAMUSCULAR; INTRAVENOUS; SUBCUTANEOUS at 09:10

## 2022-02-11 RX ADMIN — FENTANYL CITRATE 50 MCG: 50 INJECTION INTRAMUSCULAR; INTRAVENOUS at 15:10

## 2022-02-11 RX ADMIN — Medication 6 UNITS: at 14:33

## 2022-02-11 RX ADMIN — CELECOXIB 200 MG: 100 CAPSULE ORAL at 07:22

## 2022-02-11 RX ADMIN — FENTANYL CITRATE 50 MCG: 50 INJECTION INTRAMUSCULAR; INTRAVENOUS at 15:41

## 2022-02-11 RX ADMIN — FENTANYL CITRATE 25 MCG: 50 INJECTION INTRAMUSCULAR; INTRAVENOUS at 14:54

## 2022-02-11 RX ADMIN — LABETALOL HYDROCHLORIDE 5 MG: 5 INJECTION, SOLUTION INTRAVENOUS at 11:41

## 2022-02-11 NOTE — ANESTHESIA PREPROCEDURE EVALUATION
Relevant Problems   No relevant active problems       Anesthetic History   No history of anesthetic complications            Review of Systems / Medical History  Patient summary reviewed, nursing notes reviewed and pertinent labs reviewed    Pulmonary  Within defined limits                 Neuro/Psych     seizures (last one at about 13yo)        Comments: S/p L crani for ruptured aneurysm repair at age 14yo Cardiovascular    Hypertension: well controlled                   GI/Hepatic/Renal  Within defined limits              Endo/Other    Diabetes: well controlled, type 2         Other Findings              Physical Exam    Airway  Mallampati: II  TM Distance: 4 - 6 cm  Neck ROM: normal range of motion   Mouth opening: Normal     Cardiovascular    Rhythm: regular  Rate: normal         Dental  No notable dental hx       Pulmonary  Breath sounds clear to auscultation               Abdominal  GI exam deferred       Other Findings            Anesthetic Plan    ASA: 2  Anesthesia type: general          Induction: Intravenous  Anesthetic plan and risks discussed with: Patient

## 2022-02-11 NOTE — BRIEF OP NOTE
Brief Postoperative Note    Patient: Abigail Alvarado  YOB: 1980  MRN: [id number]    Date of Procedure: 2/11/2022     Pre-Op Diagnosis: Rectal cancer ([address] [C20]    Post-Op Diagnosis: Same as preoperative diagnosis.       Procedure(s):  ROBOTIC ASSISTED LOW ANTERIOR RESECTION, DIVERTING LOOP ILEOSTOMY  FLEXIBLE SIGMOIDOSCOPY, SPLENIC FLEXURE MOBILIZATION    Surgeon(s):  Ranjeet Madison MD    Surgical Assistant: Surg Asst-1: Dallin Londono    Anesthesia: General     Estimated Blood Loss (mL): less than 50     Complications: None    Specimens:   ID Type Source Tests Collected by Time Destination   1 : sigmoid colon proximal rectum Preservative   Ranjeet Madison MD 2/11/2022 1239 Pathology   2 : proximal anastamotic donut Presjesus manuelative   Ranjeet Madison MD 2/11/2022 1239 Pathology   3 : distal anastamotic ashlee Madison MD 2/11/2022 1349 Pathology        Implants: * No implants in log *    Drains:   Tim-Holland Drain 02/11/22 Right Abdomen (Active)       Findings: RECTAL CANCER    [id number]    Electronically Signed by Lakeshia Rowan MD on 2/11/2022 at 1:56 PM

## 2022-02-11 NOTE — PROGRESS NOTES
Problem: Falls - Risk of  Goal: *Absence of Falls  Description: Document Syd Lopess Fall Risk and appropriate interventions in the flowsheet. Outcome: Progressing Towards Goal  Note: Fall Risk Interventions:            Medication Interventions: Teach patient to arise slowly                   Problem: Patient Education: Go to Patient Education Activity  Goal: Patient/Family Education  Outcome: Progressing Towards Goal     Problem: Diabetes Self-Management  Goal: *Disease process and treatment process  Description: Define diabetes and identify own type of diabetes; list 3 options for treating diabetes. Outcome: Progressing Towards Goal  Goal: *Incorporating nutritional management into lifestyle  Description: Describe effect of type, amount and timing of food on blood glucose; list 3 methods for planning meals. Outcome: Progressing Towards Goal  Goal: *Incorporating physical activity into lifestyle  Description: State effect of exercise on blood glucose levels. Outcome: Progressing Towards Goal  Goal: *Developing strategies to promote health/change behavior  Description: Define the ABC's of diabetes; identify appropriate screenings, schedule and personal plan for screenings. Outcome: Progressing Towards Goal  Goal: *Using medications safely  Description: State effect of diabetes medications on diabetes; name diabetes medication taking, action and side effects. Outcome: Progressing Towards Goal  Goal: *Monitoring blood glucose, interpreting and using results  Description: Identify recommended blood glucose targets  and personal targets. Outcome: Progressing Towards Goal  Goal: *Prevention, detection, treatment of acute complications  Description: List symptoms of hyper- and hypoglycemia; describe how to treat low blood sugar and actions for lowering  high blood glucose level.   Outcome: Progressing Towards Goal  Goal: *Prevention, detection and treatment of chronic complications  Description: Define the natural course of diabetes and describe the relationship of blood glucose levels to long term complications of diabetes.   Outcome: Progressing Towards Goal  Goal: *Developing strategies to address psychosocial issues  Description: Describe feelings about living with diabetes; identify support needed and support network  Outcome: Progressing Towards Goal  Goal: *Insulin pump training  Outcome: Progressing Towards Goal  Goal: *Sick day guidelines  Outcome: Progressing Towards Goal  Goal: *Patient Specific Goal (EDIT GOAL, INSERT TEXT)  Outcome: Progressing Towards Goal     Problem: Patient Education: Go to Patient Education Activity  Goal: Patient/Family Education  Outcome: Progressing Towards Goal

## 2022-02-11 NOTE — PERIOP NOTES
TRANSFER - OUT REPORT:    Verbal report given to kevon on Erika Mary  being transferred to [address] [place] [address] for routine post - op       Report consisted of patients Situation, Background, Assessment and   Recommendations(SBAR). Information from the following report(s) Procedure Summary, Intake/Output, MAR and Recent Results was reviewed with the receiving nurse. Lines:   Peripheral IV 02/11/22 Posterior;Right Hand (Active)   Site Assessment Clean, dry, & intact 02/11/22 1615   Phlebitis Assessment 0 02/11/22 1615   Infiltration Assessment 0 02/11/22 1615   Dressing Status Clean, dry, & intact 02/11/22 1615   Dressing Type Tape;Transparent 02/11/22 1615   Hub Color/Line Status Pink;Capped 02/11/22 1615       Peripheral IV 02/11/22 Left;Posterior Hand (Active)   Site Assessment Clean, dry, & intact 02/11/22 1615   Phlebitis Assessment 0 02/11/22 1615   Infiltration Assessment 0 02/11/22 1615   Dressing Status Clean, dry, & intact 02/11/22 1615   Dressing Type Tape;Transparent 02/11/22 1615   Hub Color/Line Status Pink; Infusing 02/11/22 1615        Opportunity for questions and clarification was provided.       Patient transported with:   [facility]

## 2022-02-11 NOTE — ANESTHESIA POSTPROCEDURE EVALUATION
Procedure(s):  ROBOTIC ASSISTED LOW ANTERIOR RESECTION, DIVERTING LOOP ILEOSTOMY  FLEXIBLE SIGMOIDOSCOPY. general    Anesthesia Post Evaluation      Multimodal analgesia: multimodal analgesia used between 6 hours prior to anesthesia start to PACU discharge  Patient location during evaluation: PACU  Patient participation: complete - patient participated  Level of consciousness: awake and alert  Pain management: adequate  Airway patency: patent  Anesthetic complications: no  Cardiovascular status: acceptable  Respiratory status: acceptable  Hydration status: acceptable  Post anesthesia nausea and vomiting:  controlled  Final Post Anesthesia Temperature Assessment:  Normothermia (36.0-37.5 degrees C)      INITIAL Post-op Vital signs:   Vitals Value Taken Time   /101 02/11/22 1624   Temp 37.4 °C (99.3 °F) 02/11/22 1418   Pulse 108 02/11/22 1631   Resp 16 02/11/22 1631   SpO2 97 % 02/11/22 1631   Vitals shown include unvalidated device data.

## 2022-02-11 NOTE — ROUTINE PROCESS
TRANSFER - IN REPORT:    Verbal report received from [facility] ADEEL RN (name) on Satish Jorge  being received from PACU (unit) for routine post - op      Report consisted of patients Situation, Background, Assessment and   Recommendations(SBAR). Information from the following report(s) SBAR, Kardex, OR Summary, Intake/Output, MAR and Recent Results was reviewed with the receiving nurse. Opportunity for questions and clarification was provided. Assessment completed upon patients arrival to unit and care assumed.

## 2022-02-11 NOTE — PROGRESS NOTES
Nurse called Dr. Jordana Alfonso to report following, pt rx bottles norvasc 10 mg and benazepril 20 mg. Pt blood pressure is elevated. Awaiting return call. Will give information to oncoming shift. Nurse spoke with Dr. Elsa Hudson, orders given. Norvasc 10 mg order for now, Valium 5 mg for now, and Dilaudid 1 mg every 3 hours as needed.

## 2022-02-11 NOTE — H&P
HPI: Dhruv Meade is a 35 y.o. male presenting with chief complain of rectal cancer    Past Medical History:   Diagnosis Date    Brain aneurysm     at 10years of age   [facility])     rectal cancer    COVID-19 01/13/2022    asymptomatic    [facility]     Hypertension     Iron deficiency anemia     Seizure ([facility]     post brain surgery while in ICU ONCE! TREATED WITH DILANTIN age 10    Seizures ([facility]     age 10       Past Surgical History:   Procedure Laterality Date    FLEXIBLE SIGMOIDOSCOPY N/A 11/24/2021    SIGMOIDOSCOPY FLEXIBLE performed by Jim Etienne MD at [facility]COPY    HX COLONOSCOPY      HX INTRACRANIAL ANEURYSM REPAIR      age 10       History reviewed. No pertinent family history. Social History     Socioeconomic History    Marital status:    Tobacco Use    Smoking status: Never Smoker    Smokeless tobacco: Never Used   Vaping Use    Vaping Use: Never used   Substance and Sexual Activity    Alcohol use: Yes     Comment: socially    Drug use: Yes     Types: Marijuana     Comment: marijuana 1/27/22       Review of Systems - neg    Outpatient Medications Marked as Taking for the 2/11/22 encounter [facility] Encounter)   Medication Sig Dispense Refill    docusate sodium (Colace) 100 mg capsule Take 100 mg by mouth as needed.  ibuprofen (MOTRIN) 800 mg tablet as needed.  benazepriL (LOTENSIN) 20 mg tablet 20 mg daily.  amLODIPine (NORVASC) 5 mg tablet 5 mg daily.  ergocalciferol (ERGOCALCIFEROL) 1,250 mcg (50,000 unit) capsule Vitamin D2 50,000 unit capsule   TAKE ONE CAPSULE BY MOUTH ONCE WEEKLY      glimepiride (AMARYL) 2 mg tablet Take 2 mg by mouth every morning.  metFORMIN ER (GLUCOPHAGE XR) 500 mg tablet 500 mg two (2) times a day.          No Known Allergies    Vitals:    01/28/22 1438 02/11/22 0650   BP:  (!) 162/81   Pulse:  (!) 102   Resp:  20   Temp:  99.2 °F (37.3 °C)   SpO2:  97%   Weight: 101.6 kg (224 lb) 102.6 kg (226 lb 4.8 oz)   Height: 5' 7\" (1.702 m) 5' 7\" (1.702 m)       Physical Exam  Constitutional:       Appearance: He is well-developed. HENT:      Head: Normocephalic and atraumatic. Eyes:      Conjunctiva/sclera: Conjunctivae normal.   Abdominal:      General: There is no distension. Palpations: Abdomen is soft. There is no mass. Tenderness: There is no abdominal tenderness. Musculoskeletal:         General: Normal range of motion. Lymphadenopathy:      Cervical: No cervical adenopathy. Skin:     General: Skin is warm and dry. Neurological:      Sensory: No sensory deficit. Psychiatric:         Speech: Speech normal.         Assessment / Plan    Robotic LAR    The diagnoses and plan were discussed with the patient. All questions answered. Plan of care agreed to by all concerned.

## 2022-02-11 NOTE — PROGRESS NOTES
conducted a pre-surgery visit with Alissa Lopez, who is a 35 y.o.,male. The  provided the following Interventions:  Initiated a relationship of care and support. Offered prayer and assurance of continued prayers on patient's behalf. There is no advance directive present. Plan:  Chaplains will continue to follow and will provide pastoral care on an as needed/requested basis.  recommends bedside caregivers page  on duty if patient shows signs of acute spiritual or emotional distress.     Reiseñor [address]   Board Certified [address] [place] [address]   [phone]

## 2022-02-12 LAB
ANION GAP SERPL CALC-SCNC: 6 MMOL/L (ref 3–18)
BUN SERPL-MCNC: 9 MG/DL (ref 7–18)
BUN/CREAT SERPL: 10 (ref 12–20)
CALCIUM SERPL-MCNC: 8.1 MG/DL (ref 8.5–10.1)
CHLORIDE SERPL-SCNC: 102 MMOL/L (ref 100–111)
CO2 SERPL-SCNC: 26 MMOL/L (ref 21–32)
CREAT SERPL-MCNC: 0.9 MG/DL (ref 0.6–1.3)
ERYTHROCYTE [DISTWIDTH] IN BLOOD BY AUTOMATED COUNT: 19 % (ref 11.6–14.5)
GLUCOSE BLD STRIP.AUTO-MCNC: 111 MG/DL (ref 70–110)
GLUCOSE BLD STRIP.AUTO-MCNC: 122 MG/DL (ref 70–110)
GLUCOSE BLD STRIP.AUTO-MCNC: 134 MG/DL (ref 70–110)
GLUCOSE BLD STRIP.AUTO-MCNC: 156 MG/DL (ref 70–110)
GLUCOSE SERPL-MCNC: 138 MG/DL (ref 74–99)
HCT VFR BLD AUTO: 37.6 % (ref 36–48)
HGB BLD-MCNC: 11.3 G/DL (ref 13–16)
MAGNESIUM SERPL-MCNC: 2 MG/DL (ref 1.6–2.6)
MCH RBC QN AUTO: 23 PG (ref 24–34)
MCHC RBC AUTO-ENTMCNC: 30.1 G/DL (ref 31–37)
MCV RBC AUTO: 76.4 FL (ref 78–100)
NRBC # BLD: 0 K/UL (ref 0–0.01)
NRBC BLD-RTO: 0 PER 100 WBC
PHOSPHATE SERPL-MCNC: 5.3 MG/DL (ref 2.5–4.9)
PLATELET # BLD AUTO: 337 K/UL (ref 135–420)
PMV BLD AUTO: 9.9 FL (ref 9.2–11.8)
POTASSIUM SERPL-SCNC: 3.8 MMOL/L (ref 3.5–5.5)
RBC # BLD AUTO: 4.92 M/UL (ref 4.35–5.65)
SODIUM SERPL-SCNC: 134 MMOL/L (ref 136–145)
WBC # BLD AUTO: 12.9 K/UL (ref 4.6–13.2)

## 2022-02-12 PROCEDURE — 36415 COLL VENOUS BLD VENIPUNCTURE: CPT

## 2022-02-12 PROCEDURE — 80048 BASIC METABOLIC PNL TOTAL CA: CPT

## 2022-02-12 PROCEDURE — 74011250636 HC RX REV CODE- 250/636: Performed by: SURGERY

## 2022-02-12 PROCEDURE — 74011250637 HC RX REV CODE- 250/637: Performed by: COLON & RECTAL SURGERY

## 2022-02-12 PROCEDURE — 77030013076 HC PCH OST BAG COLO -A

## 2022-02-12 PROCEDURE — 85027 COMPLETE CBC AUTOMATED: CPT

## 2022-02-12 PROCEDURE — 65270000029 HC RM PRIVATE

## 2022-02-12 PROCEDURE — 2709999900 HC NON-CHARGEABLE SUPPLY

## 2022-02-12 PROCEDURE — 84100 ASSAY OF PHOSPHORUS: CPT

## 2022-02-12 PROCEDURE — 74011250636 HC RX REV CODE- 250/636: Performed by: COLON & RECTAL SURGERY

## 2022-02-12 PROCEDURE — 74011000250 HC RX REV CODE- 250: Performed by: COLON & RECTAL SURGERY

## 2022-02-12 PROCEDURE — 82962 GLUCOSE BLOOD TEST: CPT

## 2022-02-12 PROCEDURE — 74011636637 HC RX REV CODE- 636/637: Performed by: COLON & RECTAL SURGERY

## 2022-02-12 PROCEDURE — 83735 ASSAY OF MAGNESIUM: CPT

## 2022-02-12 RX ADMIN — GABAPENTIN 300 MG: 300 CAPSULE ORAL at 08:32

## 2022-02-12 RX ADMIN — CEFOXITIN SODIUM 2 G: 2 POWDER, FOR SOLUTION INTRAVENOUS at 03:09

## 2022-02-12 RX ADMIN — HEPARIN SODIUM 5000 UNITS: 5000 INJECTION INTRAVENOUS; SUBCUTANEOUS at 15:23

## 2022-02-12 RX ADMIN — FAMOTIDINE 20 MG: 10 INJECTION INTRAVENOUS at 08:33

## 2022-02-12 RX ADMIN — SODIUM CHLORIDE, SODIUM LACTATE, POTASSIUM CHLORIDE, AND CALCIUM CHLORIDE 125 ML/HR: 600; 310; 30; 20 INJECTION, SOLUTION INTRAVENOUS at 15:22

## 2022-02-12 RX ADMIN — HYDROMORPHONE HYDROCHLORIDE 1 MG: 1 INJECTION, SOLUTION INTRAMUSCULAR; INTRAVENOUS; SUBCUTANEOUS at 12:02

## 2022-02-12 RX ADMIN — ACETAMINOPHEN 1000 MG: 500 TABLET ORAL at 17:01

## 2022-02-12 RX ADMIN — CELECOXIB 100 MG: 100 CAPSULE ORAL at 17:01

## 2022-02-12 RX ADMIN — HYDROMORPHONE HYDROCHLORIDE 1 MG: 1 INJECTION, SOLUTION INTRAMUSCULAR; INTRAVENOUS; SUBCUTANEOUS at 03:09

## 2022-02-12 RX ADMIN — GABAPENTIN 300 MG: 300 CAPSULE ORAL at 15:23

## 2022-02-12 RX ADMIN — GABAPENTIN 300 MG: 300 CAPSULE ORAL at 22:15

## 2022-02-12 RX ADMIN — Medication 2 UNITS: at 17:02

## 2022-02-12 RX ADMIN — CELECOXIB 100 MG: 100 CAPSULE ORAL at 08:32

## 2022-02-12 RX ADMIN — ACETAMINOPHEN 1000 MG: 500 TABLET ORAL at 05:35

## 2022-02-12 RX ADMIN — HEPARIN SODIUM 5000 UNITS: 5000 INJECTION INTRAVENOUS; SUBCUTANEOUS at 08:33

## 2022-02-12 RX ADMIN — CEFOXITIN SODIUM 2 G: 2 POWDER, FOR SOLUTION INTRAVENOUS at 11:54

## 2022-02-12 RX ADMIN — HYDROMORPHONE HYDROCHLORIDE 1 MG: 1 INJECTION, SOLUTION INTRAMUSCULAR; INTRAVENOUS; SUBCUTANEOUS at 15:21

## 2022-02-12 RX ADMIN — FAMOTIDINE 20 MG: 10 INJECTION INTRAVENOUS at 22:15

## 2022-02-12 RX ADMIN — HYDROMORPHONE HYDROCHLORIDE 1 MG: 1 INJECTION, SOLUTION INTRAMUSCULAR; INTRAVENOUS; SUBCUTANEOUS at 19:52

## 2022-02-12 RX ADMIN — HYDROMORPHONE HYDROCHLORIDE 1 MG: 1 INJECTION, SOLUTION INTRAMUSCULAR; INTRAVENOUS; SUBCUTANEOUS at 08:28

## 2022-02-12 NOTE — ROUTINE PROCESS
Bedside and Verbal shift change report given to STACY Castillo (oncoming nurse) by Sen Pugh (offgoing nurse). Report included the following information SBAR, Kardex, Intake/Output, MAR and Recent Results.

## 2022-02-12 NOTE — ROUTINE PROCESS
Bedside shift change report given to Jess Goldsmith RN (oncoming nurse) by Cecil Medina RN (offgoing nurse). Report included the following information SBAR, Kardex and MAR.

## 2022-02-12 NOTE — ROUTINE PROCESS
Bedside verbal report given to Camryn Morillo (oncoming nurse) from [facility] MIRELLA ABDI RN (outgoing nurse)    Report consisted of patients Situation, Background, Assessment and   Recommendations(SBAR). Information from the following report(s): Kardex, MAR and Recent Results was reviewed with the oncoming nurse. Opportunity for questions and clarification was provided.

## 2022-02-12 NOTE — PROGRESS NOTES
Orlando Bustos M.D. FACS  PROGRESS NOTE    Name: Abigail Alvarado MRN: [id number]   : 1980 [facility]   Date: 2022 Admission Date: 2022  5:28 AM     Hospital Day: 2  1 Day Post-Op  Subjective:  Pt without acute o/n events. No n/v. He is hungry  Objective:  Vitals:    22 2327 22 0413 22 0740   BP: (!) 154/96 135/75 104/66 114/70   Pulse: 98 97 96 88   Resp: 18 18 18 18   Temp: 98.7 °F (37.1 °C) 98.2 °F (36.8 °C) 98.2 °F (36.8 °C) 98.8 °F (37.1 °C)   SpO2: 98% 94% 96% 95%   Weight:       Height:         Date 22 0700 - 22 0659 22 0700 - 22 0659   Shift 8330-0588 4988-0654 24 Hour Total 5719-1982 8962-0185 24 Hour Total   INTAKE   P.O.  120 120        P. O.  120 120      I. V.(mL/kg/hr) 7717(9.1) 570.8(0.5) 2590.8(1.1)        Volume (lactated Ringers infusion) 2000  2000        Volume (lactated Ringers infusion)  570.8 570.8        Volume (cefOXitin (MEFOXIN) 2 g in sterile water (preservative free) 20 mL iv syringe) 20  20      Shift Total(mL/kg) 8998(91.3) 690.8(6.7) 7970.9(29.2)      OUTPUT   Urine(mL/kg/hr) 1120(0.9) 1500(1.2) 2620(1.1)        Urine Output 170  170        Urine Output (mL) (Urinary Catheter 22 2- way; [place]de) 950 1500 2450      Drains 80 120 200 30  30     Output (ml) (Tim-Holland Drain 22 Right Abdomen) 80 120 200 30  30   Shift Total(mL/kg) 1200(11.7) 1620(15.8) 2820(27.5) 30(0.3)  30(0.3)    -929.2 -109.2 -30  -30   Weight (kg) 102.6 102.6 102.6 102.6 102.6 102.6         Physical Exam:    General: A&A, NAD, Ox4   Abdomen: abdomen is soft with incisional and RLQ ileostomy tenderness. Incision(s) are C/D/I. Ileostomy viable and functioning.   No   masses, organomegaly or guarding   Extremities: No c/c/e BLE  Labs:  Recent Results (from the past 24 hour(s))   GLUCOSE, POC    Collection Time: 22  2:25 PM   Result Value Ref Range    Glucose (POC) 200 (H) 70 - 110 mg/dL   GLUCOSE, POC    Collection Time: 02/11/22  6:21 PM   Result Value Ref Range    Glucose (POC) 229 (H) 70 - 110 mg/dL   GLUCOSE, POC    Collection Time: 02/11/22 11:29 PM   Result Value Ref Range    Glucose (POC) 142 (H) 70 - 287 mg/dL   METABOLIC PANEL, BASIC    Collection Time: 02/12/22  1:20 AM   Result Value Ref Range    Sodium 134 (L) 136 - 145 mmol/L    Potassium 3.8 3.5 - 5.5 mmol/L    Chloride 102 100 - 111 mmol/L    CO2 26 21 - 32 mmol/L    Anion gap 6 3.0 - 18 mmol/L    Glucose 138 (H) 74 - 99 mg/dL    BUN 9 7.0 - 18 MG/DL    Creatinine 0.90 0.6 - 1.3 MG/DL    BUN/Creatinine ratio 10 (L) 12 - 20      GFR est AA >60 >60 ml/min/1.73m2    GFR est non-AA >60 >60 ml/min/1.73m2    Calcium 8.1 (L) 8.5 - 10.1 MG/DL   CBC W/O DIFF    Collection Time: 02/12/22  1:20 AM   Result Value Ref Range    WBC 12.9 4.6 - 13.2 K/uL    RBC 4.92 4.35 - 5.65 M/uL    HGB 11.3 (L) 13.0 - 16.0 g/dL    HCT 37.6 36.0 - 48.0 %    MCV 76.4 (L) 78.0 - 100.0 FL    MCH 23.0 (L) 24.0 - 34.0 PG    MCHC 30.1 (L) 31.0 - 37.0 g/dL    RDW 19.0 (H) 11.6 - 14.5 %    PLATELET 648 703 - 058 K/uL    MPV 9.9 9.2 - 11.8 FL    NRBC 0.0 0  WBC    ABSOLUTE NRBC 0.00 0.00 - 0.01 K/uL   MAGNESIUM    Collection Time: 02/12/22  1:20 AM   Result Value Ref Range    Magnesium 2.0 1.6 - 2.6 mg/dL   PHOSPHORUS    Collection Time: 02/12/22  1:20 AM   Result Value Ref Range    Phosphorus 5.3 (H) 2.5 - 4.9 MG/DL   GLUCOSE, POC    Collection Time: 02/12/22  5:36 AM   Result Value Ref Range    Glucose (POC) 122 (H) 70 - 110 mg/dL     All Micro Results     None          Current Medications:  Current Facility-Administered Medications   Medication Dose Route Frequency Provider Last Rate Last Admin    diphenhydrAMINE (BENADRYL) injection 25 mg  25 mg IntraVENous Q6H PRN Jim Etienne MD        celecoxib (CELEBREX) capsule 100 mg  100 mg Oral BID Jim Etienne MD   100 mg at 02/12/22 4076    heparin (porcine) injection 5,000 Units  5,000 Units SubCUTAneous Q8H Ana Hopkins MD   5,000 Units at 02/12/22 6724    lactated Ringers infusion  125 mL/hr IntraVENous CONTINUOUS Ana Hopkins  mL/hr at 02/11/22 1959 125 mL/hr at 02/11/22 1959    gabapentin (NEURONTIN) capsule 300 mg  300 mg Oral TID Ana Hopkins MD   300 mg at 02/12/22 5611    famotidine (PF) (PEPCID) 20 mg in 0.9% sodium chloride 10 mL injection  20 mg IntraVENous Q12H Ana Hopkins MD   20 mg at 02/12/22 8929    acetaminophen (TYLENOL) tablet 1,000 mg  1,000 mg Oral Q6H Ana Hopkins MD   1,000 mg at 02/12/22 0535    ondansetron (ZOFRAN) injection 4 mg  4 mg IntraVENous Q6H PRN Ana Hopkins MD        cefOXitin (MEFOXIN) 2 g in sterile water (preservative free) 20 mL iv syringe  2 g IntraVENous Q8H Ana Hopkins MD   2 g at 02/12/22 0309    glucose chewable tablet 16 g  4 Tablet Oral PRN Ana Hopkins MD        glucagon [facility]) injection 1 mg  1 mg IntraMUSCular PRN Ana Hopkins MD        insulin lispro (HUMALOG) injection   SubCUTAneous Q6H Ana Hopkins MD   4 Units at 02/11/22 1821    dextrose 10% infusion 125-250 mL  125-250 mL IntraVENous PRN Neeru Martinez MD        dextrose 10% infusion 125-250 mL  125-250 mL IntraVENous PRN Ana Hopkins MD        HYDROmorphone (DILAUDID) injection 1 mg  1 mg IntraVENous Q3H PRN Mata Sepulveda MD   1 mg at 02/12/22 0828    lisinopriL (PRINIVIL, ZESTRIL) tablet 20 mg  20 mg Oral DAILY Mata Sepulveda MD   20 mg at 02/11/22 1948    amLODIPine (NORVASC) tablet 10 mg  10 mg Oral DAILY Mata Sepulveda MD   10 mg at 02/11/22 1948       Chart and notes reviewed. Data reviewed. I have evaluated and examined the patient. IMPRESSION:   · Postop day 1 from robotic LAR with diverting loop ileostomy.       PLAN:/DISCUSION:   · CLD  · OOB to chair, encourage IS  · Continue present care        Myrtle Oakes MD

## 2022-02-13 LAB
ANION GAP SERPL CALC-SCNC: 3 MMOL/L (ref 3–18)
BUN SERPL-MCNC: 7 MG/DL (ref 7–18)
BUN/CREAT SERPL: 9 (ref 12–20)
CALCIUM SERPL-MCNC: 8.2 MG/DL (ref 8.5–10.1)
CHLORIDE SERPL-SCNC: 106 MMOL/L (ref 100–111)
CO2 SERPL-SCNC: 29 MMOL/L (ref 21–32)
CREAT SERPL-MCNC: 0.81 MG/DL (ref 0.6–1.3)
ERYTHROCYTE [DISTWIDTH] IN BLOOD BY AUTOMATED COUNT: 19 % (ref 11.6–14.5)
GLUCOSE BLD STRIP.AUTO-MCNC: 120 MG/DL (ref 70–110)
GLUCOSE BLD STRIP.AUTO-MCNC: 124 MG/DL (ref 70–110)
GLUCOSE BLD STRIP.AUTO-MCNC: 128 MG/DL (ref 70–110)
GLUCOSE BLD STRIP.AUTO-MCNC: 181 MG/DL (ref 70–110)
GLUCOSE SERPL-MCNC: 132 MG/DL (ref 74–99)
HCT VFR BLD AUTO: 35.6 % (ref 36–48)
HGB BLD-MCNC: 10.6 G/DL (ref 13–16)
MAGNESIUM SERPL-MCNC: 2.1 MG/DL (ref 1.6–2.6)
MCH RBC QN AUTO: 23.1 PG (ref 24–34)
MCHC RBC AUTO-ENTMCNC: 29.8 G/DL (ref 31–37)
MCV RBC AUTO: 77.7 FL (ref 78–100)
NRBC # BLD: 0 K/UL (ref 0–0.01)
NRBC BLD-RTO: 0 PER 100 WBC
PHOSPHATE SERPL-MCNC: 3.3 MG/DL (ref 2.5–4.9)
PLATELET # BLD AUTO: 274 K/UL (ref 135–420)
PMV BLD AUTO: 10.7 FL (ref 9.2–11.8)
POTASSIUM SERPL-SCNC: 3.9 MMOL/L (ref 3.5–5.5)
RBC # BLD AUTO: 4.58 M/UL (ref 4.35–5.65)
SODIUM SERPL-SCNC: 138 MMOL/L (ref 136–145)
WBC # BLD AUTO: 7.1 K/UL (ref 4.6–13.2)

## 2022-02-13 PROCEDURE — 83735 ASSAY OF MAGNESIUM: CPT

## 2022-02-13 PROCEDURE — 74011250637 HC RX REV CODE- 250/637: Performed by: COLON & RECTAL SURGERY

## 2022-02-13 PROCEDURE — 65270000029 HC RM PRIVATE

## 2022-02-13 PROCEDURE — 2709999900 HC NON-CHARGEABLE SUPPLY

## 2022-02-13 PROCEDURE — 74011250637 HC RX REV CODE- 250/637: Performed by: SURGERY

## 2022-02-13 PROCEDURE — 74011250636 HC RX REV CODE- 250/636: Performed by: SURGERY

## 2022-02-13 PROCEDURE — 36415 COLL VENOUS BLD VENIPUNCTURE: CPT

## 2022-02-13 PROCEDURE — 74011636637 HC RX REV CODE- 636/637: Performed by: COLON & RECTAL SURGERY

## 2022-02-13 PROCEDURE — 74011250636 HC RX REV CODE- 250/636: Performed by: COLON & RECTAL SURGERY

## 2022-02-13 PROCEDURE — 74011000250 HC RX REV CODE- 250: Performed by: COLON & RECTAL SURGERY

## 2022-02-13 PROCEDURE — 82962 GLUCOSE BLOOD TEST: CPT

## 2022-02-13 PROCEDURE — 80048 BASIC METABOLIC PNL TOTAL CA: CPT

## 2022-02-13 PROCEDURE — 85027 COMPLETE CBC AUTOMATED: CPT

## 2022-02-13 PROCEDURE — 74011250636 HC RX REV CODE- 250/636

## 2022-02-13 PROCEDURE — 84100 ASSAY OF PHOSPHORUS: CPT

## 2022-02-13 RX ORDER — INSULIN LISPRO 100 [IU]/ML
INJECTION, SOLUTION INTRAVENOUS; SUBCUTANEOUS
Status: DISCONTINUED | OUTPATIENT
Start: 2022-02-14 | End: 2022-02-15 | Stop reason: HOSPADM

## 2022-02-13 RX ORDER — HYDROMORPHONE HYDROCHLORIDE 1 MG/ML
INJECTION, SOLUTION INTRAMUSCULAR; INTRAVENOUS; SUBCUTANEOUS
Status: COMPLETED
Start: 2022-02-13 | End: 2022-02-13

## 2022-02-13 RX ORDER — OXYCODONE HYDROCHLORIDE 10 MG/1
10 TABLET ORAL
Status: DISCONTINUED | OUTPATIENT
Start: 2022-02-13 | End: 2022-02-14

## 2022-02-13 RX ADMIN — HYDROMORPHONE HYDROCHLORIDE 1 MG: 1 INJECTION, SOLUTION INTRAMUSCULAR; INTRAVENOUS; SUBCUTANEOUS at 04:10

## 2022-02-13 RX ADMIN — HEPARIN SODIUM 5000 UNITS: 5000 INJECTION INTRAVENOUS; SUBCUTANEOUS at 23:42

## 2022-02-13 RX ADMIN — HYDROMORPHONE HYDROCHLORIDE 1 MG: 1 INJECTION, SOLUTION INTRAMUSCULAR; INTRAVENOUS; SUBCUTANEOUS at 10:51

## 2022-02-13 RX ADMIN — GABAPENTIN 300 MG: 300 CAPSULE ORAL at 21:33

## 2022-02-13 RX ADMIN — ACETAMINOPHEN 1000 MG: 500 TABLET ORAL at 23:42

## 2022-02-13 RX ADMIN — HEPARIN SODIUM 5000 UNITS: 5000 INJECTION INTRAVENOUS; SUBCUTANEOUS at 08:17

## 2022-02-13 RX ADMIN — ACETAMINOPHEN 1000 MG: 500 TABLET ORAL at 00:11

## 2022-02-13 RX ADMIN — FAMOTIDINE 20 MG: 10 INJECTION INTRAVENOUS at 08:17

## 2022-02-13 RX ADMIN — OXYCODONE HYDROCHLORIDE 10 MG: 10 TABLET ORAL at 19:31

## 2022-02-13 RX ADMIN — GABAPENTIN 300 MG: 300 CAPSULE ORAL at 08:17

## 2022-02-13 RX ADMIN — ACETAMINOPHEN 1000 MG: 500 TABLET ORAL at 06:30

## 2022-02-13 RX ADMIN — HYDROMORPHONE HYDROCHLORIDE 1 MG: 1 INJECTION, SOLUTION INTRAMUSCULAR; INTRAVENOUS; SUBCUTANEOUS at 00:11

## 2022-02-13 RX ADMIN — SODIUM CHLORIDE, SODIUM LACTATE, POTASSIUM CHLORIDE, AND CALCIUM CHLORIDE 125 ML/HR: 600; 310; 30; 20 INJECTION, SOLUTION INTRAVENOUS at 00:15

## 2022-02-13 RX ADMIN — CELECOXIB 100 MG: 100 CAPSULE ORAL at 16:42

## 2022-02-13 RX ADMIN — ACETAMINOPHEN 1000 MG: 500 TABLET ORAL at 12:39

## 2022-02-13 RX ADMIN — GABAPENTIN 300 MG: 300 CAPSULE ORAL at 16:42

## 2022-02-13 RX ADMIN — SODIUM CHLORIDE, SODIUM LACTATE, POTASSIUM CHLORIDE, AND CALCIUM CHLORIDE 125 ML/HR: 600; 310; 30; 20 INJECTION, SOLUTION INTRAVENOUS at 04:11

## 2022-02-13 RX ADMIN — HEPARIN SODIUM 5000 UNITS: 5000 INJECTION INTRAVENOUS; SUBCUTANEOUS at 16:41

## 2022-02-13 RX ADMIN — LISINOPRIL 20 MG: 20 TABLET ORAL at 08:17

## 2022-02-13 RX ADMIN — FAMOTIDINE 20 MG: 10 INJECTION INTRAVENOUS at 21:34

## 2022-02-13 RX ADMIN — CELECOXIB 100 MG: 100 CAPSULE ORAL at 08:17

## 2022-02-13 RX ADMIN — AMLODIPINE BESYLATE 10 MG: 10 TABLET ORAL at 08:17

## 2022-02-13 RX ADMIN — Medication 2 UNITS: at 21:47

## 2022-02-13 RX ADMIN — HEPARIN SODIUM 5000 UNITS: 5000 INJECTION INTRAVENOUS; SUBCUTANEOUS at 00:11

## 2022-02-13 NOTE — ROUTINE PROCESS
Bedside and Verbal shift change report given to STACY Castillo (oncoming nurse) by David Odom (offgoing nurse). Report included the following information SBAR, Kardex, Procedure Summary, Intake/Output, MAR and Recent Results.

## 2022-02-13 NOTE — PROGRESS NOTES
Orlando Flores M.D. FACS  PROGRESS NOTE    Name: Jacob Armstrong MRN: [id number]   : 1980 [facility]   Date: 2022 Admission Date: 2022  5:28 AM     Hospital Day: 3  2 Days Post-Op  Subjective:  Patient without acute overnight events. He states that the Forde catheter is irritating his bladder. He is hungry. He reports flatus and content from the ileostomy. Objective:  Vitals:    22 1539 22 1958 22 0349 22 0744   BP: (!) 156/79 125/86 131/73 135/80   Pulse: 91 92 87 96   Resp: 16 16 16 16   Temp: 99.5 °F (37.5 °C) 97.9 °F (36.6 °C) 98.6 °F (37 °C) 98.6 °F (37 °C)   SpO2: 93% 95% 98% 95%   Weight:       Height:         Date 22 0700 - 22 0659 22 0700 - 22 0659   Shift 4184-3608 5683-9481 24 Hour Total 3441-4207 5999-6192 24 Hour Total   INTAKE   P.O. 240 480 720        P. O. 240 480 720      I. V.(mL/kg/hr) 1125(0.9) 606.3(0.5) 1731.3(0.7)        Volume (lactated Ringers infusion) 1125 606.3 1731.3      Shift Total(mL/kg) 3231(80.5) 1086. 3(10.6) 2451. 3(23.9)      OUTPUT   Urine(mL/kg/hr) 1300(1.1) 1900(1.5) 3200(1.3)        Urine Output (mL) (Urinary Catheter 22 2- way; [place]) 1300 1900 3200      Drains 80 50 130 80  80     Output (ml) (Tim-Holland Drain 22 Right Abdomen) 80 50 130 80  80   Stool  250 250        Output (ml) (Ileostomy 22 Right  Abdomen)  250 250      Shift Total(mL/kg) 1380(13.4) 2200(21.4) 3580(34.9) 80(0.8)  80(0.8)   NET -15 -1113.8 -1128.8 -80  -80   Weight (kg) 102.6 102.6 102.6 102.6 102.6 102.6         Physical Exam:    General: Awake and alert, oriented x4, no apparent distress   Abdomen: abdomen is soft with incisional and right lower quadrant tenderness. Incision(s) are C/D/I. Ileostomy functional and viable. Green enteric content and air within the colostomy bag.   No masses, organomegaly or guarding   Extremities: No c/c/e BLE  Labs:  Recent Results (from the past 24 hour(s)) GLUCOSE, POC    Collection Time: 02/12/22 11:19 AM   Result Value Ref Range    Glucose (POC) 134 (H) 70 - 110 mg/dL   GLUCOSE, POC    Collection Time: 02/12/22  3:42 PM   Result Value Ref Range    Glucose (POC) 156 (H) 70 - 110 mg/dL   GLUCOSE, POC    Collection Time: 02/12/22  9:38 PM   Result Value Ref Range    Glucose (POC) 111 (H) 70 - 757 mg/dL   METABOLIC PANEL, BASIC    Collection Time: 02/13/22  3:03 AM   Result Value Ref Range    Sodium 138 136 - 145 mmol/L    Potassium 3.9 3.5 - 5.5 mmol/L    Chloride 106 100 - 111 mmol/L    CO2 29 21 - 32 mmol/L    Anion gap 3 3.0 - 18 mmol/L    Glucose 132 (H) 74 - 99 mg/dL    BUN 7 7.0 - 18 MG/DL    Creatinine 0.81 0.6 - 1.3 MG/DL    BUN/Creatinine ratio 9 (L) 12 - 20      GFR est AA >60 >60 ml/min/1.73m2    GFR est non-AA >60 >60 ml/min/1.73m2    Calcium 8.2 (L) 8.5 - 10.1 MG/DL   CBC W/O DIFF    Collection Time: 02/13/22  3:03 AM   Result Value Ref Range    WBC 7.1 4.6 - 13.2 K/uL    RBC 4.58 4.35 - 5.65 M/uL    HGB 10.6 (L) 13.0 - 16.0 g/dL    HCT 35.6 (L) 36.0 - 48.0 %    MCV 77.7 (L) 78.0 - 100.0 FL    MCH 23.1 (L) 24.0 - 34.0 PG    MCHC 29.8 (L) 31.0 - 37.0 g/dL    RDW 19.0 (H) 11.6 - 14.5 %    PLATELET 049 796 - 230 K/uL    MPV 10.7 9.2 - 11.8 FL    NRBC 0.0 0  WBC    ABSOLUTE NRBC 0.00 0.00 - 0.01 K/uL   MAGNESIUM    Collection Time: 02/13/22  3:03 AM   Result Value Ref Range    Magnesium 2.1 1.6 - 2.6 mg/dL   PHOSPHORUS    Collection Time: 02/13/22  3:03 AM   Result Value Ref Range    Phosphorus 3.3 2.5 - 4.9 MG/DL   GLUCOSE, POC    Collection Time: 02/13/22  6:24 AM   Result Value Ref Range    Glucose (POC) 128 (H) 70 - 110 mg/dL     All Micro Results     None          Current Medications:  Current Facility-Administered Medications   Medication Dose Route Frequency Provider Last Rate Last Admin    diphenhydrAMINE (BENADRYL) injection 25 mg  25 mg IntraVENous Q6H PRN Oziel Hernandez MD        celecoxib (CELEBREX) capsule 100 mg  100 mg Oral BID Valdene Shone, MD   100 mg at 02/13/22 0817    heparin (porcine) injection 5,000 Units  5,000 Units SubCUTAneous Q8H Valdene Shone, MD   5,000 Units at 02/13/22 4645    lactated Ringers infusion  125 mL/hr IntraVENous CONTINUOUS Valdene Shone,  mL/hr at 02/13/22 0411 125 mL/hr at 02/13/22 0411    gabapentin (NEURONTIN) capsule 300 mg  300 mg Oral TID Valdene Shone, MD   300 mg at 02/13/22 0817    famotidine (PF) (PEPCID) 20 mg in 0.9% sodium chloride 10 mL injection  20 mg IntraVENous Q12H Valdene Shone, MD   20 mg at 02/13/22 9868    acetaminophen (TYLENOL) tablet 1,000 mg  1,000 mg Oral Q6H Valdene Shone, MD   1,000 mg at 02/13/22 0630    ondansetron (ZOFRAN) injection 4 mg  4 mg IntraVENous Q6H PRN Valdene Shone, MD        glucose chewable tablet 16 g  4 Tablet Oral PRN Valdene Shone, MD        glucagon [facility]) injection 1 mg  1 mg IntraMUSCular PRN Valdene Shone, MD        insulin lispro (HUMALOG) injection   SubCUTAneous Q6H Valdene Shone, MD   2 Units at 02/12/22 1702    dextrose 10% infusion 125-250 mL  125-250 mL IntraVENous PRN Beatriz Lombardi MD        dextrose 10% infusion 125-250 mL  125-250 mL IntraVENous PRN Valdene Shone, MD        HYDROmorphone (DILAUDID) injection 1 mg  1 mg IntraVENous Q3H PRN Yamila Hoover MD   1 mg at 02/13/22 0410    lisinopriL (PRINIVIL, ZESTRIL) tablet 20 mg  20 mg Oral DAILY Yamila Hoover MD   20 mg at 02/13/22 0817    amLODIPine (NORVASC) tablet 10 mg  10 mg Oral DAILY Yamila Hoover MD   10 mg at 02/13/22 1360       Chart and notes reviewed. Data reviewed. I have evaluated and examined the patient.         IMPRESSION:   · POD 2 from lap LAR      PLAN:/DISCUSION:   · Full liquid diet  · Continue Forde catheter  · P.o. pain medication        Elie Mensah MD

## 2022-02-14 LAB
ANION GAP SERPL CALC-SCNC: 4 MMOL/L (ref 3–18)
BUN SERPL-MCNC: 5 MG/DL (ref 7–18)
BUN/CREAT SERPL: 6 (ref 12–20)
CALCIUM SERPL-MCNC: 8.5 MG/DL (ref 8.5–10.1)
CHLORIDE SERPL-SCNC: 107 MMOL/L (ref 100–111)
CO2 SERPL-SCNC: 29 MMOL/L (ref 21–32)
CREAT SERPL-MCNC: 0.84 MG/DL (ref 0.6–1.3)
ERYTHROCYTE [DISTWIDTH] IN BLOOD BY AUTOMATED COUNT: 18.8 % (ref 11.6–14.5)
GLUCOSE BLD STRIP.AUTO-MCNC: 133 MG/DL (ref 70–110)
GLUCOSE BLD STRIP.AUTO-MCNC: 135 MG/DL (ref 70–110)
GLUCOSE BLD STRIP.AUTO-MCNC: 142 MG/DL (ref 70–110)
GLUCOSE BLD STRIP.AUTO-MCNC: 142 MG/DL (ref 70–110)
GLUCOSE SERPL-MCNC: 107 MG/DL (ref 74–99)
HCT VFR BLD AUTO: 36.2 % (ref 36–48)
HGB BLD-MCNC: 10.8 G/DL (ref 13–16)
MAGNESIUM SERPL-MCNC: 1.9 MG/DL (ref 1.6–2.6)
MCH RBC QN AUTO: 23.2 PG (ref 24–34)
MCHC RBC AUTO-ENTMCNC: 29.8 G/DL (ref 31–37)
MCV RBC AUTO: 77.8 FL (ref 78–100)
NRBC # BLD: 0.02 K/UL (ref 0–0.01)
NRBC BLD-RTO: 0.3 PER 100 WBC
PHOSPHATE SERPL-MCNC: 3.9 MG/DL (ref 2.5–4.9)
PLATELET # BLD AUTO: 258 K/UL (ref 135–420)
PMV BLD AUTO: 9.9 FL (ref 9.2–11.8)
POTASSIUM SERPL-SCNC: 3.7 MMOL/L (ref 3.5–5.5)
RBC # BLD AUTO: 4.65 M/UL (ref 4.35–5.65)
SODIUM SERPL-SCNC: 140 MMOL/L (ref 136–145)
WBC # BLD AUTO: 7.1 K/UL (ref 4.6–13.2)

## 2022-02-14 PROCEDURE — 84100 ASSAY OF PHOSPHORUS: CPT

## 2022-02-14 PROCEDURE — 77030013076 HC PCH OST BAG COLO -A

## 2022-02-14 PROCEDURE — 0DJD8ZZ INSPECTION OF LOWER INTESTINAL TRACT, VIA NATURAL OR ARTIFICIAL OPENING ENDOSCOPIC: ICD-10-PCS | Performed by: COLON & RECTAL SURGERY

## 2022-02-14 PROCEDURE — 80048 BASIC METABOLIC PNL TOTAL CA: CPT

## 2022-02-14 PROCEDURE — 85027 COMPLETE CBC AUTOMATED: CPT

## 2022-02-14 PROCEDURE — 82962 GLUCOSE BLOOD TEST: CPT

## 2022-02-14 PROCEDURE — 0DBN4ZZ EXCISION OF SIGMOID COLON, PERCUTANEOUS ENDOSCOPIC APPROACH: ICD-10-PCS | Performed by: COLON & RECTAL SURGERY

## 2022-02-14 PROCEDURE — 8E0W4CZ ROBOTIC ASSISTED PROCEDURE OF TRUNK REGION, PERCUTANEOUS ENDOSCOPIC APPROACH: ICD-10-PCS | Performed by: COLON & RECTAL SURGERY

## 2022-02-14 PROCEDURE — 74011250637 HC RX REV CODE- 250/637: Performed by: SURGERY

## 2022-02-14 PROCEDURE — 77030040162

## 2022-02-14 PROCEDURE — 2709999900 HC NON-CHARGEABLE SUPPLY

## 2022-02-14 PROCEDURE — 0DBP4ZZ EXCISION OF RECTUM, PERCUTANEOUS ENDOSCOPIC APPROACH: ICD-10-PCS | Performed by: COLON & RECTAL SURGERY

## 2022-02-14 PROCEDURE — 0D1B4Z4 BYPASS ILEUM TO CUTANEOUS, PERCUTANEOUS ENDOSCOPIC APPROACH: ICD-10-PCS | Performed by: COLON & RECTAL SURGERY

## 2022-02-14 PROCEDURE — 74011250637 HC RX REV CODE- 250/637: Performed by: COLON & RECTAL SURGERY

## 2022-02-14 PROCEDURE — 65270000029 HC RM PRIVATE

## 2022-02-14 PROCEDURE — 74011000250 HC RX REV CODE- 250: Performed by: COLON & RECTAL SURGERY

## 2022-02-14 PROCEDURE — 74011250636 HC RX REV CODE- 250/636: Performed by: COLON & RECTAL SURGERY

## 2022-02-14 PROCEDURE — 44213 LAP MOBIL SPLENIC FL ADD-ON: CPT | Performed by: COLON & RECTAL SURGERY

## 2022-02-14 PROCEDURE — 83735 ASSAY OF MAGNESIUM: CPT

## 2022-02-14 PROCEDURE — 44208 L COLECTOMY/COLOPROCTOSTOMY: CPT | Performed by: COLON & RECTAL SURGERY

## 2022-02-14 PROCEDURE — 36415 COLL VENOUS BLD VENIPUNCTURE: CPT

## 2022-02-14 RX ORDER — OXYCODONE HYDROCHLORIDE 5 MG/1
5-10 TABLET ORAL
Status: DISCONTINUED | OUTPATIENT
Start: 2022-02-14 | End: 2022-02-15 | Stop reason: HOSPADM

## 2022-02-14 RX ADMIN — HEPARIN SODIUM 5000 UNITS: 5000 INJECTION INTRAVENOUS; SUBCUTANEOUS at 08:26

## 2022-02-14 RX ADMIN — GABAPENTIN 300 MG: 300 CAPSULE ORAL at 17:43

## 2022-02-14 RX ADMIN — FAMOTIDINE 20 MG: 10 INJECTION INTRAVENOUS at 20:08

## 2022-02-14 RX ADMIN — GABAPENTIN 300 MG: 300 CAPSULE ORAL at 22:03

## 2022-02-14 RX ADMIN — ACETAMINOPHEN 1000 MG: 500 TABLET ORAL at 17:43

## 2022-02-14 RX ADMIN — ACETAMINOPHEN 1000 MG: 500 TABLET ORAL at 07:02

## 2022-02-14 RX ADMIN — FAMOTIDINE 20 MG: 10 INJECTION INTRAVENOUS at 08:26

## 2022-02-14 RX ADMIN — AMLODIPINE BESYLATE 10 MG: 10 TABLET ORAL at 08:26

## 2022-02-14 RX ADMIN — ACETAMINOPHEN 1000 MG: 500 TABLET ORAL at 12:41

## 2022-02-14 RX ADMIN — HEPARIN SODIUM 5000 UNITS: 5000 INJECTION INTRAVENOUS; SUBCUTANEOUS at 17:44

## 2022-02-14 RX ADMIN — CELECOXIB 100 MG: 100 CAPSULE ORAL at 17:43

## 2022-02-14 RX ADMIN — LISINOPRIL 20 MG: 20 TABLET ORAL at 08:26

## 2022-02-14 RX ADMIN — OXYCODONE HYDROCHLORIDE 10 MG: 10 TABLET ORAL at 08:26

## 2022-02-14 RX ADMIN — CELECOXIB 100 MG: 100 CAPSULE ORAL at 08:26

## 2022-02-14 RX ADMIN — GABAPENTIN 300 MG: 300 CAPSULE ORAL at 08:26

## 2022-02-14 RX ADMIN — OXYCODONE HYDROCHLORIDE 10 MG: 10 TABLET ORAL at 20:21

## 2022-02-14 RX ADMIN — OXYCODONE HYDROCHLORIDE 10 MG: 10 TABLET ORAL at 01:40

## 2022-02-14 NOTE — PROGRESS NOTES
Problem: Falls - Risk of  Goal: *Absence of Falls  Description: Document Claribel Ames Fall Risk and appropriate interventions in the flowsheet. Outcome: Progressing Towards Goal  Note: Fall Risk Interventions:  Mobility Interventions: OT consult for ADLs,Patient to call before getting OOB,Strengthening exercises (ROM-active/passive)         Medication Interventions: Teach patient to arise slowly    Elimination Interventions: Call light in reach,Patient to call for help with toileting needs              Problem: Patient Education: Go to Patient Education Activity  Goal: Patient/Family Education  Outcome: Progressing Towards Goal     Problem: Diabetes Self-Management  Goal: *Disease process and treatment process  Description: Define diabetes and identify own type of diabetes; list 3 options for treating diabetes. Outcome: Progressing Towards Goal  Goal: *Incorporating nutritional management into lifestyle  Description: Describe effect of type, amount and timing of food on blood glucose; list 3 methods for planning meals. Outcome: Progressing Towards Goal  Goal: *Incorporating physical activity into lifestyle  Description: State effect of exercise on blood glucose levels. Outcome: Progressing Towards Goal  Goal: *Developing strategies to promote health/change behavior  Description: Define the ABC's of diabetes; identify appropriate screenings, schedule and personal plan for screenings. Outcome: Progressing Towards Goal  Goal: *Using medications safely  Description: State effect of diabetes medications on diabetes; name diabetes medication taking, action and side effects. Outcome: Progressing Towards Goal  Goal: *Monitoring blood glucose, interpreting and using results  Description: Identify recommended blood glucose targets  and personal targets.   Outcome: Progressing Towards Goal  Goal: *Prevention, detection, treatment of acute complications  Description: List symptoms of hyper- and hypoglycemia; describe how to treat low blood sugar and actions for lowering  high blood glucose level. Outcome: Progressing Towards Goal  Goal: *Prevention, detection and treatment of chronic complications  Description: Define the natural course of diabetes and describe the relationship of blood glucose levels to long term complications of diabetes. Outcome: Progressing Towards Goal  Goal: *Developing strategies to address psychosocial issues  Description: Describe feelings about living with diabetes; identify support needed and support network  Outcome: Progressing Towards Goal  Goal: *Insulin pump training  Outcome: Progressing Towards Goal  Goal: *Sick day guidelines  Outcome: Progressing Towards Goal  Goal: *Patient Specific Goal (EDIT GOAL, INSERT TEXT)  Outcome: Progressing Towards Goal     Problem: Patient Education: Go to Patient Education Activity  Goal: Patient/Family Education  Outcome: Progressing Towards Goal     Problem: Pressure Injury - Risk of  Goal: *Prevention of pressure injury  Description: Document Shalom Scale and appropriate interventions in the flowsheet.   Outcome: Progressing Towards Goal  Note: Pressure Injury Interventions:  Sensory Interventions: Assess changes in LOC    Moisture Interventions: Minimize layers    Activity Interventions: Increase time out of bed,Pressure redistribution bed/mattress(bed type)    Mobility Interventions: HOB 30 degrees or less,Pressure redistribution bed/mattress (bed type)    Nutrition Interventions: Document food/fluid/supplement intake    Friction and Shear Interventions: Sit at 90-degree angle                Problem: Patient Education: Go to Patient Education Activity  Goal: Patient/Family Education  Outcome: Progressing Towards Goal     Problem: Pain  Goal: *Control of Pain  Outcome: Progressing Towards Goal     Problem: Patient Education: Go to Patient Education Activity  Goal: Patient/Family Education  Outcome: Progressing Towards Goal     Problem: Patient Education: Go to Patient Education Activity  Goal: Patient/Family Education  Outcome: Progressing Towards Goal     Problem: Colorectal Surgery:Day of Surgery (Intiate SCIP Measure)  Goal: *No signs and symptoms of infection or wound complications  Outcome: Progressing Towards Goal  Goal: *Optimal pain control at patient's stated goal  Outcome: Progressing Towards Goal  Goal: *Adequate urinary output (equal to or greater than 30 milliliters per hour)  Outcome: Progressing Towards Goal  Goal: *Hemodynamically stable  Outcome: Progressing Towards Goal  Goal: *Tolerating diet  Outcome: Progressing Towards Goal  Goal: *Demonstrates progressive activity  Outcome: Progressing Towards Goal  Goal: Nutrition/Diet  Outcome: Progressing Towards Goal  Goal: Activity/Safety  Outcome: Progressing Towards Goal  Goal: Psychosocial  Outcome: Progressing Towards Goal  Goal: Respiratory  Outcome: Progressing Towards Goal  Goal: Consults, if ordered  Outcome: Progressing Towards Goal  Goal: Medications  Outcome: Progressing Towards Goal  Goal: Treatments/Interventions/Procedures  Outcome: Progressing Towards Goal     Problem: Colorectal Surgery:Day Two  Goal: Off Pathway (Use only if patient is Off Pathway)  Outcome: Progressing Towards Goal  Goal: *No signs and symptoms of infection or wound complications  Outcome: Progressing Towards Goal  Goal: *Optimal pain control at patient's stated goal  Outcome: Progressing Towards Goal  Goal: *Adequate urinary output (equal to or greater than 30 milliliters per hour)  Outcome: Progressing Towards Goal  Goal: *Lungs clear or at baseline  Outcome: Progressing Towards Goal  Goal: *Hemodynamically stable  Outcome: Progressing Towards Goal  Goal: *Tolerating diet  Outcome: Progressing Towards Goal  Goal: *Demonstrates progressive activity  Outcome: Progressing Towards Goal  Goal: Nutrition/Diet  Outcome: Progressing Towards Goal  Goal: Activity/Safety  Outcome: Progressing Towards Goal  Goal: Psychosocial  Outcome: Progressing Towards Goal  Goal: Respiratory  Outcome: Progressing Towards Goal  Goal: Medications  Outcome: Progressing Towards Goal  Goal: Treatments/Interventions/Procedures  Outcome: Progressing Towards Goal  Goal: Diagnostic Test/Procedures  Outcome: Progressing Towards Goal  Goal: Discharge Planning  Outcome: Progressing Towards Goal     Problem: Colorectal Surgery:Day Three  Goal: Off Pathway (Use only if patient is Off Pathway)  Outcome: Progressing Towards Goal  Goal: *No signs and symptoms of infection or wound complications  Outcome: Progressing Towards Goal  Goal: *Optimal pain control at patient's stated goal  Outcome: Progressing Towards Goal  Goal: *Adequate urinary output (equal to or greater than 30 milliliters per hour)  Outcome: Progressing Towards Goal  Goal: *Lungs clear or at baseline  Outcome: Progressing Towards Goal  Goal: *Hemodynamically stable  Outcome: Progressing Towards Goal  Goal: *Tolerating diet  Outcome: Progressing Towards Goal  Goal: *Demonstrates progressive activity  Outcome: Progressing Towards Goal  Goal: Nutrition/Diet  Outcome: Progressing Towards Goal  Goal: Activity/Safety  Outcome: Progressing Towards Goal  Goal: Psychosocial  Outcome: Progressing Towards Goal  Goal: Respiratory  Outcome: Progressing Towards Goal  Goal: Medications  Outcome: Progressing Towards Goal  Goal: Treatments/Interventions/Procedures  Outcome: Progressing Towards Goal  Goal: Discharge Planning  Outcome: Progressing Towards Goal     Problem: Colorectal Surgery:Day Four  Goal: Off Pathway (Use only if patient is Off Pathway)  Outcome: Progressing Towards Goal  Goal: *No signs and symptoms of infection or wound complications  Outcome: Progressing Towards Goal  Goal: *Optimal pain control at patient's stated goal  Outcome: Progressing Towards Goal  Goal: *Adequate urinary output (equal to or greater than 30 milliliters per hour)  Outcome: Progressing Towards Goal  Goal: *Lungs clear or at baseline  Outcome: Progressing Towards Goal  Goal: *Hemodynamically stable  Outcome: Progressing Towards Goal  Goal: *Tolerating diet  Outcome: Progressing Towards Goal  Goal: *Demonstrates progressive activity  Outcome: Progressing Towards Goal  Goal: Nutrition/Diet  Outcome: Progressing Towards Goal  Goal: Activity/Safety  Outcome: Progressing Towards Goal  Goal: Psychosocial  Outcome: Progressing Towards Goal  Goal: Respiratory  Outcome: Progressing Towards Goal  Goal: Medications  Outcome: Progressing Towards Goal  Goal: Treatments/Interventions/Procedures  Outcome: Progressing Towards Goal  Goal: Discharge Planning  Outcome: Progressing Towards Goal     Problem: Colorectal Surgery:Day Five  Goal: Off Pathway (Use only if patient is Off Pathway)  Outcome: Progressing Towards Goal  Goal: *No signs and symptoms of infection or wound complications  Outcome: Progressing Towards Goal  Goal: *Optimal pain control at patient's stated goal  Outcome: Progressing Towards Goal  Goal: *Adequate urinary output (equal to or greater than 30 milliliters per hour)  Outcome: Progressing Towards Goal  Goal: *Lungs clear or at baseline  Outcome: Progressing Towards Goal  Goal: *Hemodynamically stable  Outcome: Progressing Towards Goal  Goal: *Tolerating diet  Outcome: Progressing Towards Goal  Goal: *Demonstrates progressive activity  Outcome: Progressing Towards Goal  Goal: Nutrition/Diet  Outcome: Progressing Towards Goal  Goal: Activity/Safety  Outcome: Progressing Towards Goal  Goal: Psychosocial  Outcome: Progressing Towards Goal  Goal: Respiratory  Outcome: Progressing Towards Goal  Goal: Medications  Outcome: Progressing Towards Goal  Goal: Treatments/Interventions/Procedures  Outcome: Progressing Towards Goal

## 2022-02-14 NOTE — PROGRESS NOTES
Patient accepted by [facility] 2/18/2022.  will continue to monitor and assist with transition of care needs.     WHITNEY LoveN, RN  Care Management  Pager # [phone]

## 2022-02-14 NOTE — PROGRESS NOTES
Verbal FOC given for accepting Home Health agency. Home Health orders called to [facility]. CM spoke with Idalia. [facility] does not accept the patients insurance. Home Health referrals sent via JAIDEN to [facility] (declined) , [facility] (declined), [facility] (decline, SOC d/t SOC next week), [facility] (declined, at capacity for insurance), [facility]. 1102- CM called [facility]. Received . Left message requesting return call. 1114- Return call from Sandra Shetty at [facility]. Adwoa checking with office to see if the patient's insurance is accepted. [address] [facility] referrals sent via NewsBreak to [facility] (declined), [facility] (declined), At [facility]) and Geronimo at [facility].  will continue to monitor and assist with transition of care needs.     WHITNEY NielsenN, RN  Care Management  Pager # [phone]

## 2022-02-14 NOTE — PROGRESS NOTES
Reason for Admission:  Rectal cancer ([facility] [address] [C20]                 RUR Score:  10%           Plan for utilizing home health:   yes                     Likelihood of Readmission:   LOW                         Transition of Care Plan:             Initial assessment completed with patient. Cognitive status of patient: oriented to time, place, person and situation. Face sheet information confirmed:  yes. The patient designates Antony benitez to participate in his discharge plan and to receive any needed information. This patient lives in a single family home with wife. Patient is able to navigate steps as needed. Prior to hospitalization, patient was considered to be independent with ADLs/IADLS : yes . Patient has a current ACP document on file: no      Healthcare Decision Maker: The patient's wife will be available to transport patient home upon discharge. The patient already has no,  medical equipment available in the home. Patient is not currently active with home health. Patient has not stayed in a skilled nursing facility or rehab. Was  stay within last 60 days : no. This patient is on dialysis :no        List of available Home Health agencies were provided and reviewed with the patient prior to discharge. Freedom of choice signed: yes,     Currently, the discharge plan is Home with Home Health. The patient states that he can obtain his medications from the pharmacy, and take his medications as directed. Patient's current insurance is [facility] [employer]  PCP Verified by CM: Yes (2 months ago)  Mode of Transport at Discharge:  Other (see comment) (wifeAntony)  Transition of Care Consult (CM Consult): Discharge [address]: No  Reason Outside [place]:  (did not accept insurance)  Physical Therapy Consult: No  Occupational Therapy Consult: No  Support Systems: Spouse/Significant Other  Confirm Follow Up Transport: Self  The Patient and/or Patient Representative was Provided with a Choice of Provider and Agrees with the Discharge Plan?: Yes  Freedom of Choice List was Provided with Basic Dialogue that Supports the Patient's Individualized Plan of Care/Goals, Treatment Preferences and Shares the Quality Data Associated with the Providers?: Yes  Discharge Location  Patient Expects to be Discharged to[de-identified] Home with home health         will continue to monitor and assist with transition of care needs.     Gustabo Kayser, BSN, RN  Care Management  Pager # [phone]

## 2022-02-14 NOTE — PROGRESS NOTES
Bedside shift change report given to [facility] (oncoming nurse) by Stas Billy (offgoing nurse). Report included the following information SBAR, Kardex and MAR.

## 2022-02-14 NOTE — WOUND CARE
Physical Exam   Room 508: Focused wound assess for stoma care  Pt currently sitting in bedside chair & I was able to see pt while sitting in chair    RLQ abdomen ileostomy stoma, 1\" long by 1 5/8\" wide, beefy red in color, oval shaped, peristomal skin intact with creases, moat around stoma, & puckers from sutures mostly medially. Skin care provided with [employer]Kare skin prep & skin barrier ring. Fitted with 2 piece coloplast appliance, cut to fit & drainable pouch. Taught pt how to remove current appliance, care for skin, & apply new stoma wafer & pouch. We went over how a stoma functions, how bowel functions, & what to expect & questions answered. Will plan to see patient again tomorrow at 10am with wife hopefully available to continue stoma teaching & discuss supplies for stoma care use. Discussed with Emely Burgess CM: pt has home health care being set up for continued care. Showed pt youtube video on how to empty your ostomy appliance at home.    Max OLSONN, RN, [facility] [place], [address]

## 2022-02-14 NOTE — PROGRESS NOTES
[facility]  [address] [facility] [address] [facility] [address]  [phone]  Colon and Rectal Surgery Progress Note      Patient: Conner Osman MRN: [id number]  SSN: [id number]    YOB: 1980  Age: 35 y.o. Sex: male      Admit Date: 2/11/2022    LOS: 3 days     Subjective: Tolerating fulls. Objective:     Vitals:    02/13/22 1130 02/13/22 1715 02/13/22 2024 02/14/22 0437   BP: 135/77 135/82 120/68 138/68   Pulse: 89 98 97 88   Resp: 18 18 17 17   Temp: 98.4 °F (36.9 °C) 98.5 °F (36.9 °C) 97.3 °F (36.3 °C) 98.6 °F (37 °C)   SpO2: 95% 95% 97% 98%   Weight:       Height:            Intake and Output:  Current Shift: No intake/output data recorded.   Last three shifts: 02/12 1901 - 02/14 0700  In: 4091.3 [P.O.:2360; I.V.:1731.3]  Out: 7040 [Urine:5700; Drains:215]    Physical Exam:     Constitutional: well developed, in NAD  Abdomen: soft, NTND    Lab/Data Review:    CMP:   Lab Results   Component Value Date/Time     02/14/2022 03:26 AM    K 3.7 02/14/2022 03:26 AM     02/14/2022 03:26 AM    CO2 29 02/14/2022 03:26 AM    AGAP 4 02/14/2022 03:26 AM     (H) 02/14/2022 03:26 AM    BUN 5 (L) 02/14/2022 03:26 AM    CREA 0.84 02/14/2022 03:26 AM    GFRAA >60 02/14/2022 03:26 AM    GFRNA >60 02/14/2022 03:26 AM    CA 8.5 02/14/2022 03:26 AM    MG 1.9 02/14/2022 03:26 AM    PHOS 3.9 02/14/2022 03:26 AM     CBC:   Lab Results   Component Value Date/Time    WBC 7.1 02/14/2022 03:26 AM    HGB 10.8 (L) 02/14/2022 03:26 AM    HCT 36.2 02/14/2022 03:26 AM     02/14/2022 03:26 AM        Assessment:     S/p robotic LAR for rectal cancer    Plan:     Advance to solids  D/C sanchez and TINY  Stoma nurse consult  Home tomorrow if tolerates solids    Signed By: Betito Tabares MD        February 14, 2022

## 2022-02-15 VITALS
SYSTOLIC BLOOD PRESSURE: 118 MMHG | WEIGHT: 200.4 LBS | OXYGEN SATURATION: 97 % | TEMPERATURE: 98.3 F | RESPIRATION RATE: 16 BRPM | DIASTOLIC BLOOD PRESSURE: 72 MMHG | HEIGHT: 67 IN | BODY MASS INDEX: 31.45 KG/M2 | HEART RATE: 71 BPM

## 2022-02-15 LAB
ANION GAP SERPL CALC-SCNC: 6 MMOL/L (ref 3–18)
BUN SERPL-MCNC: 8 MG/DL (ref 7–18)
BUN/CREAT SERPL: 8 (ref 12–20)
CALCIUM SERPL-MCNC: 8.3 MG/DL (ref 8.5–10.1)
CHLORIDE SERPL-SCNC: 103 MMOL/L (ref 100–111)
CO2 SERPL-SCNC: 27 MMOL/L (ref 21–32)
CREAT SERPL-MCNC: 1 MG/DL (ref 0.6–1.3)
ERYTHROCYTE [DISTWIDTH] IN BLOOD BY AUTOMATED COUNT: 19 % (ref 11.6–14.5)
GLUCOSE BLD STRIP.AUTO-MCNC: 136 MG/DL (ref 70–110)
GLUCOSE SERPL-MCNC: 183 MG/DL (ref 74–99)
HCT VFR BLD AUTO: 36.2 % (ref 36–48)
HGB BLD-MCNC: 10.8 G/DL (ref 13–16)
MAGNESIUM SERPL-MCNC: 2.2 MG/DL (ref 1.6–2.6)
MCH RBC QN AUTO: 23.3 PG (ref 24–34)
MCHC RBC AUTO-ENTMCNC: 29.8 G/DL (ref 31–37)
MCV RBC AUTO: 78.2 FL (ref 78–100)
NRBC # BLD: 0.03 K/UL (ref 0–0.01)
NRBC BLD-RTO: 0.4 PER 100 WBC
PHOSPHATE SERPL-MCNC: 4.5 MG/DL (ref 2.5–4.9)
PLATELET # BLD AUTO: 254 K/UL (ref 135–420)
PMV BLD AUTO: 10.1 FL (ref 9.2–11.8)
POTASSIUM SERPL-SCNC: 3.4 MMOL/L (ref 3.5–5.5)
RBC # BLD AUTO: 4.63 M/UL (ref 4.35–5.65)
SODIUM SERPL-SCNC: 136 MMOL/L (ref 136–145)
WBC # BLD AUTO: 6.7 K/UL (ref 4.6–13.2)

## 2022-02-15 PROCEDURE — 2709999900 HC NON-CHARGEABLE SUPPLY

## 2022-02-15 PROCEDURE — 82962 GLUCOSE BLOOD TEST: CPT

## 2022-02-15 PROCEDURE — 77030040162

## 2022-02-15 PROCEDURE — 83735 ASSAY OF MAGNESIUM: CPT

## 2022-02-15 PROCEDURE — 74011250636 HC RX REV CODE- 250/636: Performed by: COLON & RECTAL SURGERY

## 2022-02-15 PROCEDURE — 74011250637 HC RX REV CODE- 250/637: Performed by: SURGERY

## 2022-02-15 PROCEDURE — 80048 BASIC METABOLIC PNL TOTAL CA: CPT

## 2022-02-15 PROCEDURE — 84100 ASSAY OF PHOSPHORUS: CPT

## 2022-02-15 PROCEDURE — 74011250637 HC RX REV CODE- 250/637: Performed by: COLON & RECTAL SURGERY

## 2022-02-15 PROCEDURE — 85027 COMPLETE CBC AUTOMATED: CPT

## 2022-02-15 PROCEDURE — 36415 COLL VENOUS BLD VENIPUNCTURE: CPT

## 2022-02-15 PROCEDURE — 74011000250 HC RX REV CODE- 250: Performed by: COLON & RECTAL SURGERY

## 2022-02-15 PROCEDURE — 77030013076 HC PCH OST BAG COLO -A

## 2022-02-15 RX ORDER — GABAPENTIN 300 MG/1
300 CAPSULE ORAL 3 TIMES DAILY
Qty: 21 CAPSULE | Refills: 0 | Status: SHIPPED | OUTPATIENT
Start: 2022-02-15 | End: 2022-02-22

## 2022-02-15 RX ORDER — ACETAMINOPHEN 500 MG
1000 TABLET ORAL EVERY 6 HOURS
Qty: 56 TABLET | Refills: 0 | Status: SHIPPED | OUTPATIENT
Start: 2022-02-15 | End: 2022-02-22

## 2022-02-15 RX ORDER — CELECOXIB 100 MG/1
100 CAPSULE ORAL 2 TIMES DAILY
Qty: 14 CAPSULE | Refills: 0 | Status: SHIPPED | OUTPATIENT
Start: 2022-02-15 | End: 2022-02-22

## 2022-02-15 RX ORDER — OXYCODONE HYDROCHLORIDE 5 MG/1
5 TABLET ORAL
Qty: 40 TABLET | Refills: 0 | Status: SHIPPED | OUTPATIENT
Start: 2022-02-15 | End: 2022-02-18

## 2022-02-15 RX ADMIN — HEPARIN SODIUM 5000 UNITS: 5000 INJECTION INTRAVENOUS; SUBCUTANEOUS at 00:29

## 2022-02-15 RX ADMIN — AMLODIPINE BESYLATE 10 MG: 10 TABLET ORAL at 08:50

## 2022-02-15 RX ADMIN — LISINOPRIL 20 MG: 20 TABLET ORAL at 09:00

## 2022-02-15 RX ADMIN — ACETAMINOPHEN 1000 MG: 500 TABLET ORAL at 00:30

## 2022-02-15 RX ADMIN — CELECOXIB 100 MG: 100 CAPSULE ORAL at 08:50

## 2022-02-15 RX ADMIN — ACETAMINOPHEN 1000 MG: 500 TABLET ORAL at 05:29

## 2022-02-15 RX ADMIN — GABAPENTIN 300 MG: 300 CAPSULE ORAL at 08:50

## 2022-02-15 RX ADMIN — FAMOTIDINE 20 MG: 10 INJECTION INTRAVENOUS at 08:50

## 2022-02-15 RX ADMIN — HEPARIN SODIUM 5000 UNITS: 5000 INJECTION INTRAVENOUS; SUBCUTANEOUS at 08:50

## 2022-02-15 NOTE — OP NOTES
[address] [place] [address]   OPERATIVE REPORT    Name:  Shalom Snowden  MR#:   [id number]  :  1980  ACCOUNT #:  [de-identified]  DATE OF SERVICE:  2022    PREOPERATIVE DIAGNOSIS:  Rectal cancer. POSTOPERATIVE DIAGNOSIS:  Rectal cancer. PROCEDURES PERFORMED:  Robotic low anterior resection, diverting loop ileostomy, splenic flexure mobilization. SURGEON:  Gary Desouza. Joshua Caldwell MD.    ASSISTANT:  Amparo Hughes. ANESTHESIA:  General.    COMPLICATIONS:  None. SPECIMENS REMOVED:  Sigmoid colon and proximal rectum to Pathology. IMPLANTS:  None. ESTIMATED BLOOD LOSS:  50 mL. FINDINGS:  Rectal cancer. INDICATION:  The patient is a 75-year-old male with an upper rectal cancer. He had a transrectal ultrasound showing this to be a T2 cancer. We decided to forego neoadjuvant therapy and proceed to the operating room for robotic low anterior resection. I explained the risks of the procedure to the patient including bleeding, infection, injury to surrounding structures, need for temporary or permanent stoma, and death. He understood and wished to proceed. DESCRIPTION OF PROCEDURE:  The patient was properly identified in the holding area and brought to the operating room. He was laid supine on the operating room table. General anesthesia was administered. Forde catheter was placed. He was placed in a split-leg position with his arms tucked to his side and chest strapped to the bed. Abdomen and perineal areas were prepped and draped in the usual sterile fashion. We made a small stab incision beneath the left subcostal margin. We inserted the Veress needle and insufflated the abdomen to 15 mmHg. We placed robotic ports in the left upper quadrant, upper midline, right lateral and right lower quadrants. The right lower quadrant port site was a 12 mm port site. The rest were 8. The patient was placed in Trendelenburg position with right side down.   Bowel was swept out of the way and the robot was docked. We scored the mesentery along the aorta and performed medial-to-lateral dissection of the left colon. Left ureter was identified and unharmed. We circumferentially dissected around the inferior mesenteric artery and stapled across this at its base with a single firing of a vascular load robotic stapler. We then completed the medial-to-lateral dissection. We marked the junction of the descending and sigmoid colon and took down lateral attachments along the white line of Toldt down to the level of the rectum. The sigmoid colon was clamped and flexible sigmoidoscopy was performed to identify the lowermost extent of the rectal tumor. The tumor was within the abdominal cavity; however, it appeared we would need to dissect into the extraperitoneal rectum to attain adequate distal margin. We scored the rectal sulci and anterior rectum and dissected down posteriorly initially obtaining a good mesorectal excision, which appeared complete. We transected lateral attachments and lateral stalks as well. We dissected anteriorly staying away from the seminal vesicles. We continued this dissection downward until we were a few centimeters deep into the extraperitoneal rectum. We stapled across the rectum with two firings of a 45 mm green load robotic stapler to complete our dissection here. We turned our attention to the inferior mesenteric artery and the mesentery. We transected mesentery proximal to this to the junction of descending and sigmoid colon. ICG dye was injected and we confirmed healthy blood flow to this point using [employer]. The patient was placed in reverse Trendelenburg position. The robot was re-docked and rotated towards the left upper quadrant.   We took down the entirety of the splenic flexure first by incising the omentum medially and entering the lesser sac transecting all omentum laterally and taking down lateral attachments of the upper descending colon and splenic flexure so that the entire splenic flexure was mobilized downward to the level of the duodenum. It should also be noted that we did transect the inferior mesenteric vein to obtain adequate length for anastomosis. We extracorporealized the sigmoid colon and rectum through a muscle-splitting incision in the right lower quadrant. This was where we would ultimately make our ileostomy. We used a wound protector. We transected the descending colon with an auto-pursestring device and passed the specimen off the field. We entered the anvil of a 29 mm EEA stapler and tied this in place with the pursestring. We cleared away the staple line of fat. There were no diverticula present. We placed this back into the abdominal cavity. We re-insufflated the abdomen to 15 mmHg. We placed the EEA stapler per anus and advanced it to the staple line opening the spike just beside the staple line. We attached the anvil of the proximal colon and closed the device after assuring proper orientation and no twisting of the mesentery. We fired the stapler. Air leak test was performed with flexible sigmoidoscopy and was negative. The anastomosis appeared fully intact with healthy pink mucosa proximally and distally. Blood and fluid were suctioned from the abdominal cavity. We made sure there was no small bowel beneath the mesenteric rent. We placed a 15 round Tim-Holland drain in the pelvis and brought this out through an incision that we had made for a port in the patient's right lateral region. Ultimately, we re-grasped the distal ileum in order to make a temporary ileostomy. We brought out the small bowel through the previous extraction site. We then closed the right lower quadrant 12 mm port site at the level of the fascia with 0 Vicryl suture. Subcutaneous tissues were irrigated and skin incisions were closed with 4-0 Monocryl subcuticular suture. Steri-Strips were applied.   It should also be noted that an additional assistant port was made in the right lateral region. This was a 5 mm port. We also closed it with 4-0 Monocryl and Steri-Strip applied. We then turned our attention to the ileostomy site. This had a rather wide opening because we needed to extend the fascial opening to remove the tumor. We, therefore, closed the fascia partially with #1 PDS suture and subsequently matured the loop ileostomy in standard fashion with 3-0 Vicryl suture. Ileostomy appliance was applied. The patient tolerated the procedure well. All instrument, sponge, and needle counts were correct at the end of the case x2. The patient was awoken from anesthesia. He was extubated and transported to the PACU in stable condition.       Tanja Mcgregor MD      [id number]  D:  02/14/2022 9:47  T:  02/14/2022 19:45  JOB #:  [id number]

## 2022-02-15 NOTE — DISCHARGE INSTRUCTIONS
DISCHARGE SUMMARY from Nurse    PATIENT INSTRUCTIONS:    After general anesthesia or intravenous sedation, for 24 hours or while taking prescription Narcotics:  · Limit your activities  · Do not drive and operate hazardous machinery  · Do not make important personal or business decisions  · Do  not drink alcoholic beverages  · If you have not urinated within 8 hours after discharge, please contact your surgeon on call. Report the following to your surgeon:  · Excessive pain, swelling, redness or odor of or around the surgical area  · Temperature over 100.5  · Nausea and vomiting lasting longer than 4 hours or if unable to take medications  · Any signs of decreased circulation or nerve impairment to extremity: change in color, persistent  numbness, tingling, coldness or increase pain  · Any questions    What to do at Home:  Recommended activity: Activity as tolerated,as tolerated, no heavy lifting    If you experience any of the following symptoms elevated temperature notify doctor. arm      *  Please give a list of your current medications to your Primary Care Provider. *  Please update this list whenever your medications are discontinued, doses are      changed, or new medications (including over-the-counter products) are added. *  Please carry medication information at all times in case of emergency situations. These are general instructions for a healthy lifestyle:    No smoking/ No tobacco products/ Avoid exposure to second hand smoke  Surgeon General's Warning:  Quitting smoking now greatly reduces serious risk to your health.     Obesity, smoking, and sedentary lifestyle greatly increases your risk for illness    A healthy diet, regular physical exercise & weight monitoring are important for maintaining a healthy lifestyle    You may be retaining fluid if you have a history of heart failure or if you experience any of the following symptoms:  Weight gain of 3 pounds or more overnight or 5 pounds in a week, increased swelling in our hands or feet or shortness of breath while lying flat in bed. Please call your doctor as soon as you notice any of these symptoms; do not wait until your next office visit. The discharge information has been reviewed with the patient. The patient verbalized understanding. Discharge medications reviewed with the patient and appropriate educational materials and side effects teaching were provided. ___________________________________________________________________________________________________________________________________Instructions After Colectomy    No heavy lifting (nothing more than 10-15 lbs)    Low fiber diet - avoid raw fruits and vegetables, but small amounts of cooked vegetables are ok. Eat white bread products instead of wheat bread products. Meats, fish, chicken, noodles are ok. Showers over your wounds are ok, but avoid submerging wounds in pools or baths.  The strips over your wound will fall off on their own or I will remove them in the office    Make an office follow up appointment in 2 weeks    Call office if ileostomy makes more than 1 liter per day for 2 consecutive days

## 2022-02-15 NOTE — PROGRESS NOTES
Bedside and Verbal shift change report given to [facility] (oncoming nurse) by David Villavicencio RN (offgoing nurse). Report included the following information SBAR, Kardex, Intake/Output and MAR.

## 2022-02-15 NOTE — PROGRESS NOTES
Discharge order noted for today. Pt has been accepted to [address] [place] [address]. Met with patient and wife and are agreeable to the transition plan today. Transport has been arranged through wife. Patient's discharge summary and home health  orders have been forwarded to [facility]  [facility] via [facility] [employer].   Discharge information has been documented on the AVS.       WHITNEY BarrettN, RN  Care Management

## 2022-02-15 NOTE — PROGRESS NOTES
Problem: Falls - Risk of  Goal: *Absence of Falls  Description: Document Arash Arroyos Fall Risk and appropriate interventions in the flowsheet. 2/15/2022 0813 by Josee Shi RN  Outcome: Progressing Towards Goal  Note: Fall Risk Interventions:  Mobility Interventions: OT consult for ADLs,Patient to call before getting OOB,Strengthening exercises (ROM-active/passive)         Medication Interventions: Teach patient to arise slowly    Elimination Interventions: Call light in reach,Patient to call for help with toileting needs           2/14/2022 1849 by Josee Shi RN  Outcome: Progressing Towards Goal  Note: Fall Risk Interventions:  Mobility Interventions: OT consult for ADLs,Patient to call before getting OOB,Strengthening exercises (ROM-active/passive)         Medication Interventions: Teach patient to arise slowly    Elimination Interventions: Call light in reach,Patient to call for help with toileting needs              Problem: Patient Education: Go to Patient Education Activity  Goal: Patient/Family Education  2/15/2022 0813 by Josee Shi RN  Outcome: Progressing Towards Goal  2/14/2022 1849 by Josee Shi RN  Outcome: Progressing Towards Goal     Problem: Diabetes Self-Management  Goal: *Disease process and treatment process  Description: Define diabetes and identify own type of diabetes; list 3 options for treating diabetes. 2/15/2022 0813 by Josee Shi RN  Outcome: Progressing Towards Goal  2/14/2022 1849 by Josee Shi RN  Outcome: Progressing Towards Goal  Goal: *Incorporating nutritional management into lifestyle  Description: Describe effect of type, amount and timing of food on blood glucose; list 3 methods for planning meals.   2/15/2022 0813 by Josee Shi RN  Outcome: Progressing Towards Goal  2/14/2022 1849 by Josee Shi RN  Outcome: Progressing Towards Goal  Goal: *Incorporating physical activity into lifestyle  Description: State effect of exercise on blood glucose levels. 2/15/2022 0813 by Venu Smith RN  Outcome: Progressing Towards Goal  2/14/2022 1849 by Venu Smith RN  Outcome: Progressing Towards Goal  Goal: *Developing strategies to promote health/change behavior  Description: Define the ABC's of diabetes; identify appropriate screenings, schedule and personal plan for screenings. 2/15/2022 0813 by Venu Smith RN  Outcome: Progressing Towards Goal  2/14/2022 1849 by Venu Smith RN  Outcome: Progressing Towards Goal  Goal: *Using medications safely  Description: State effect of diabetes medications on diabetes; name diabetes medication taking, action and side effects. 2/15/2022 0813 by Venu Smith RN  Outcome: Progressing Towards Goal  2/14/2022 1849 by Venu Smith RN  Outcome: Progressing Towards Goal  Goal: *Monitoring blood glucose, interpreting and using results  Description: Identify recommended blood glucose targets  and personal targets. 2/15/2022 0813 by Venu Smith RN  Outcome: Progressing Towards Goal  2/14/2022 1849 by Venu Smith RN  Outcome: Progressing Towards Goal  Goal: *Prevention, detection, treatment of acute complications  Description: List symptoms of hyper- and hypoglycemia; describe how to treat low blood sugar and actions for lowering  high blood glucose level. 2/15/2022 0813 by Venu Smith RN  Outcome: Progressing Towards Goal  2/14/2022 1849 by Venu Smith RN  Outcome: Progressing Towards Goal  Goal: *Prevention, detection and treatment of chronic complications  Description: Define the natural course of diabetes and describe the relationship of blood glucose levels to long term complications of diabetes.   2/15/2022 0813 by Venu Smith RN  Outcome: Progressing Towards Goal  2/14/2022 1849 by Venu Smith RN  Outcome: Progressing Towards Goal  Goal: *Developing strategies to address psychosocial issues  Description: Describe feelings about living with diabetes; identify support needed and support network  2/15/2022 0813 by Dori Nelson RN  Outcome: Progressing Towards Goal  2/14/2022 1849 by Dori Nelson RN  Outcome: Progressing Towards Goal  Goal: *Insulin pump training  2/15/2022 0813 by Dori Nelson RN  Outcome: Progressing Towards Goal  2/14/2022 1849 by Dori Nelson RN  Outcome: Progressing Towards Goal  Goal: *Sick day guidelines  2/15/2022 0813 by Dori Nelson RN  Outcome: Progressing Towards Goal  2/14/2022 1849 by Dori Nelson RN  Outcome: Progressing Towards Goal  Goal: *Patient Specific Goal (EDIT GOAL, INSERT TEXT)  2/15/2022 0813 by Dori Nelson RN  Outcome: Progressing Towards Goal  2/14/2022 1849 by Dori Nelson RN  Outcome: Progressing Towards Goal     Problem: Patient Education: Go to Patient Education Activity  Goal: Patient/Family Education  2/15/2022 0813 by Dori Nelson RN  Outcome: Progressing Towards Goal  2/14/2022 1849 by Dori Nelson RN  Outcome: Progressing Towards Goal     Problem: Pressure Injury - Risk of  Goal: *Prevention of pressure injury  Description: Document Shalom Scale and appropriate interventions in the flowsheet.   2/15/2022 0813 by Dori Nelson RN  Outcome: Progressing Towards Goal  Note: Pressure Injury Interventions:  Sensory Interventions: Assess changes in LOC    Moisture Interventions: Minimize layers    Activity Interventions: Increase time out of bed    Mobility Interventions: HOB 30 degrees or less    Nutrition Interventions: Document food/fluid/supplement intake    Friction and Shear Interventions: Minimize layers             2/14/2022 1849 by Dori Nelson RN  Outcome: Progressing Towards Goal  Note: Pressure Injury Interventions:  Sensory Interventions: Assess changes in LOC    Moisture Interventions: Minimize layers    Activity Interventions: Increase time out of bed,Pressure redistribution bed/mattress(bed type)    Mobility Interventions: HOB 30 degrees or less,Pressure redistribution bed/mattress (bed type)    Nutrition Interventions: Document food/fluid/supplement intake    Friction and Shear Interventions: Sit at 90-degree angle                Problem: Patient Education: Go to Patient Education Activity  Goal: Patient/Family Education  2/15/2022 0813 by Andrey Lares RN  Outcome: Progressing Towards Goal  2/14/2022 1849 by Andrey Lares RN  Outcome: Progressing Towards Goal     Problem: Pain  Goal: *Control of Pain  2/15/2022 0813 by Andrey Lares RN  Outcome: Progressing Towards Goal  2/14/2022 1849 by Andrey Lares RN  Outcome: Progressing Towards Goal     Problem: Patient Education: Go to Patient Education Activity  Goal: Patient/Family Education  2/15/2022 0813 by Andrey Lares RN  Outcome: Progressing Towards Goal  2/14/2022 1849 by Andrey Lares RN  Outcome: Progressing Towards Goal     Problem: Patient Education: Go to Patient Education Activity  Goal: Patient/Family Education  2/15/2022 0813 by Andrey Lares RN  Outcome: Progressing Towards Goal  2/14/2022 1849 by Andrey Lares RN  Outcome: Progressing Towards Goal     Problem: Colorectal Surgery:Day of Surgery (Intiate SCIP Measure)  Goal: *No signs and symptoms of infection or wound complications  0/04/5736 0813 by Andrey Lares RN  Outcome: Progressing Towards Goal  2/14/2022 1849 by Andrey Lares RN  Outcome: Progressing Towards Goal  Goal: *Optimal pain control at patient's stated goal  2/15/2022 0813 by Andrey Lares RN  Outcome: Progressing Towards Goal  2/14/2022 1849 by Andrey Lares RN  Outcome: Progressing Towards Goal  Goal: *Adequate urinary output (equal to or greater than 30 milliliters per hour)  2/15/2022 0813 by Andrey Lares RN  Outcome: Progressing Towards Goal  2/14/2022 1849 by Andrey Lares RN  Outcome: Progressing Towards Goal  Goal: *Hemodynamically stable  2/15/2022 0813 by Andrey Lares RN  Outcome: Progressing Towards Goal  2/14/2022 1849 by Ariane Thornton, RN  Outcome: Progressing Towards Goal  Goal: *Tolerating diet  2/15/2022 0813 by Ariane Thornton, RN  Outcome: Progressing Towards Goal  2/14/2022 1849 by Ariane Thornton, RN  Outcome: Progressing Towards Goal  Goal: *Demonstrates progressive activity  2/15/2022 0813 by Ariane Thornton, RN  Outcome: Progressing Towards Goal  2/14/2022 1849 by Ariane Thornton, RN  Outcome: Progressing Towards Goal  Goal: Nutrition/Diet  2/15/2022 0813 by Ariane Thornton, RN  Outcome: Progressing Towards Goal  2/14/2022 1849 by Ariane Thornton, RN  Outcome: Progressing Towards Goal  Goal: Activity/Safety  2/15/2022 0813 by Ariane Thornton, RN  Outcome: Progressing Towards Goal  2/14/2022 1849 by Ariane Thornton, RN  Outcome: Progressing Towards Goal  Goal: Psychosocial  2/15/2022 0813 by Ariane Thornton, RN  Outcome: Progressing Towards Goal  2/14/2022 1849 by Ariane Thornton, RN  Outcome: Progressing Towards Goal  Goal: Respiratory  2/15/2022 0813 by Ariane Thornton, RN  Outcome: Progressing Towards Goal  2/14/2022 1849 by Ariane Thornton, RN  Outcome: Progressing Towards Goal  Goal: Consults, if ordered  2/15/2022 0813 by Ariane Thornton, RN  Outcome: Progressing Towards Goal  2/14/2022 1849 by Ariane Thornton, RN  Outcome: Progressing Towards Goal  Goal: Medications  2/15/2022 0813 by Ariane Thornton, RN  Outcome: Progressing Towards Goal  2/14/2022 1849 by Ariane Thornton, RN  Outcome: Progressing Towards Goal  Goal: Treatments/Interventions/Procedures  2/15/2022 0813 by Ariane Thornton, RN  Outcome: Progressing Towards Goal  2/14/2022 1849 by Ariane Thornton, RN  Outcome: Progressing Towards Goal     Problem: Colorectal Surgery:Day Two  Goal: Off Pathway (Use only if patient is Off Pathway)  2/15/2022 0813 by Ariane Thornton, RN  Outcome: Progressing Towards Goal  2/14/2022 1849 by Ariane Thornton, RN  Outcome: Progressing Towards Goal  Goal: *No signs and symptoms of infection or wound complications  0/15/1202 0813 by Josee Shi RN  Outcome: Progressing Towards Goal  2/14/2022 1849 by Josee Shi RN  Outcome: Progressing Towards Goal  Goal: *Optimal pain control at patient's stated goal  2/15/2022 0813 by Josee Shi RN  Outcome: Progressing Towards Goal  2/14/2022 1849 by Josee Shi RN  Outcome: Progressing Towards Goal  Goal: *Adequate urinary output (equal to or greater than 30 milliliters per hour)  2/15/2022 0813 by Josee Shi RN  Outcome: Progressing Towards Goal  2/14/2022 1849 by Josee Shi RN  Outcome: Progressing Towards Goal  Goal: *Lungs clear or at baseline  2/15/2022 0813 by Josee Shi RN  Outcome: Progressing Towards Goal  2/14/2022 1849 by Josee Shi RN  Outcome: Progressing Towards Goal  Goal: *Hemodynamically stable  2/15/2022 0813 by Josee Shi RN  Outcome: Progressing Towards Goal  2/14/2022 1849 by Josee Shi RN  Outcome: Progressing Towards Goal  Goal: *Tolerating diet  2/15/2022 0813 by Josee Shi RN  Outcome: Progressing Towards Goal  2/14/2022 1849 by Josee Shi RN  Outcome: Progressing Towards Goal  Goal: *Demonstrates progressive activity  2/15/2022 0813 by Josee Shi RN  Outcome: Progressing Towards Goal  2/14/2022 1849 by Josee Shi RN  Outcome: Progressing Towards Goal  Goal: Nutrition/Diet  2/15/2022 0813 by Josee Shi RN  Outcome: Progressing Towards Goal  2/14/2022 1849 by Josee Shi RN  Outcome: Progressing Towards Goal  Goal: Activity/Safety  2/15/2022 0813 by Josee Shi RN  Outcome: Progressing Towards Goal  2/14/2022 1849 by Josee Shi RN  Outcome: Progressing Towards Goal  Goal: Psychosocial  2/15/2022 0813 by Josee Shi RN  Outcome: Progressing Towards Goal  2/14/2022 1849 by Josee Shi RN  Outcome: Progressing Towards Goal  Goal: Respiratory  2/15/2022 0813 by Josee Shi RN  Outcome: Progressing Towards Goal  2/14/2022 1849 by Josee Shi RN  Outcome: Progressing Towards Goal  Goal: Medications  2/15/2022 0813 by Osei Burroughs RN  Outcome: Progressing Towards Goal  2/14/2022 1849 by Osei Burroughs RN  Outcome: Progressing Towards Goal  Goal: Treatments/Interventions/Procedures  2/15/2022 0813 by Osei Burroughs RN  Outcome: Progressing Towards Goal  2/14/2022 1849 by Osei Burroughs RN  Outcome: Progressing Towards Goal  Goal: Diagnostic Test/Procedures  2/15/2022 0813 by Osei Burroughs RN  Outcome: Progressing Towards Goal  2/14/2022 1849 by Osei Burroughs RN  Outcome: Progressing Towards Goal  Goal: Discharge Planning  2/15/2022 0813 by Osei Burroughs RN  Outcome: Progressing Towards Goal  2/14/2022 1849 by Osei Burroughs RN  Outcome: Progressing Towards Goal     Problem: Colorectal Surgery:Day Three  Goal: Off Pathway (Use only if patient is Off Pathway)  2/15/2022 0813 by Osei Burroughs RN  Outcome: Progressing Towards Goal  2/14/2022 1849 by Osei Burroughs RN  Outcome: Progressing Towards Goal  Goal: *No signs and symptoms of infection or wound complications  6/49/8405 0813 by Osei Burroughs RN  Outcome: Progressing Towards Goal  2/14/2022 1849 by Osei Burroughs RN  Outcome: Progressing Towards Goal  Goal: *Optimal pain control at patient's stated goal  2/15/2022 0813 by Osei Burroughs RN  Outcome: Progressing Towards Goal  2/14/2022 1849 by Osei Burroughs RN  Outcome: Progressing Towards Goal  Goal: *Adequate urinary output (equal to or greater than 30 milliliters per hour)  2/15/2022 0813 by Osei Burroughs RN  Outcome: Progressing Towards Goal  2/14/2022 1849 by Osei Burroughs RN  Outcome: Progressing Towards Goal  Goal: *Lungs clear or at baseline  2/15/2022 0813 by Osei Burroughs RN  Outcome: Progressing Towards Goal  2/14/2022 1849 by Osei Burroughs RN  Outcome: Progressing Towards Goal  Goal: *Hemodynamically stable  2/15/2022 0813 by Osei Burroughs RN  Outcome: Progressing Towards Goal  2/14/2022 1849 by Huy Frederick, [facility], RN  Outcome: Progressing Towards Goal  Goal: *Tolerating diet  2/15/2022 0813 by Rajani Hodge RN  Outcome: Progressing Towards Goal  2/14/2022 1849 by Rajani Hodge RN  Outcome: Progressing Towards Goal  Goal: *Demonstrates progressive activity  2/15/2022 0813 by Rajani Hodge RN  Outcome: Progressing Towards Goal  2/14/2022 1849 by Rajani Hodge RN  Outcome: Progressing Towards Goal  Goal: Nutrition/Diet  2/15/2022 0813 by Rajani Hodge RN  Outcome: Progressing Towards Goal  2/14/2022 1849 by Rajani Hodge RN  Outcome: Progressing Towards Goal  Goal: Activity/Safety  2/15/2022 0813 by Rajani Hodge RN  Outcome: Progressing Towards Goal  2/14/2022 1849 by Rajani Hodge RN  Outcome: Progressing Towards Goal  Goal: Psychosocial  2/15/2022 0813 by Rajani Hodge RN  Outcome: Progressing Towards Goal  2/14/2022 1849 by Rajani Hodge RN  Outcome: Progressing Towards Goal  Goal: Respiratory  2/15/2022 0813 by Rajani Hodge RN  Outcome: Progressing Towards Goal  2/14/2022 1849 by Rajani Hodge RN  Outcome: Progressing Towards Goal  Goal: Medications  2/15/2022 0813 by Rajani Hodge RN  Outcome: Progressing Towards Goal  2/14/2022 1849 by Rajani Hodge RN  Outcome: Progressing Towards Goal  Goal: Treatments/Interventions/Procedures  2/15/2022 0813 by Rajani Hodge RN  Outcome: Progressing Towards Goal  2/14/2022 1849 by Rajani Hodge RN  Outcome: Progressing Towards Goal  Goal: Discharge Planning  2/15/2022 0813 by Rajani Hodge RN  Outcome: Progressing Towards Goal  2/14/2022 1849 by Rajani Hodge RN  Outcome: Progressing Towards Goal     Problem: Colorectal Surgery:Day Four  Goal: Off Pathway (Use only if patient is Off Pathway)  2/15/2022 0813 by Rajani Hodge RN  Outcome: Progressing Towards Goal  2/14/2022 1849 by Rajani Hodge RN  Outcome: Progressing Towards Goal  Goal: *No signs and symptoms of infection or wound complications  8/19/0783 0813 by Yuli Call, RN  Outcome: Progressing Towards Goal  2/14/2022 1849 by Yuli Call, RN  Outcome: Progressing Towards Goal  Goal: *Optimal pain control at patient's stated goal  2/15/2022 0813 by Yuli Call, RN  Outcome: Progressing Towards Goal  2/14/2022 1849 by Yuli Call, RN  Outcome: Progressing Towards Goal  Goal: *Adequate urinary output (equal to or greater than 30 milliliters per hour)  2/15/2022 0813 by Yuli Call, RN  Outcome: Progressing Towards Goal  2/14/2022 1849 by Yuli Call, RN  Outcome: Progressing Towards Goal  Goal: *Lungs clear or at baseline  2/15/2022 0813 by Yuli Call, RN  Outcome: Progressing Towards Goal  2/14/2022 1849 by Yuli Call, RN  Outcome: Progressing Towards Goal  Goal: *Hemodynamically stable  2/15/2022 0813 by Yuli Call, RN  Outcome: Progressing Towards Goal  2/14/2022 1849 by Yuli Call, RN  Outcome: Progressing Towards Goal  Goal: *Tolerating diet  2/15/2022 0813 by Yuli Call, RN  Outcome: Progressing Towards Goal  2/14/2022 1849 by Yuli Call, RN  Outcome: Progressing Towards Goal  Goal: *Demonstrates progressive activity  2/15/2022 0813 by Yuli Call, RN  Outcome: Progressing Towards Goal  2/14/2022 1849 by Yuli Call, RN  Outcome: Progressing Towards Goal  Goal: Nutrition/Diet  2/15/2022 0813 by Yuli Call, RN  Outcome: Progressing Towards Goal  2/14/2022 1849 by Yuli Call, RN  Outcome: Progressing Towards Goal  Goal: Activity/Safety  2/15/2022 0813 by Yuli Call, RN  Outcome: Progressing Towards Goal  2/14/2022 1849 by Yuli Call, RN  Outcome: Progressing Towards Goal  Goal: Psychosocial  2/15/2022 0813 by Yuli Call, RN  Outcome: Progressing Towards Goal  2/14/2022 1849 by Yuli Call, RN  Outcome: Progressing Towards Goal  Goal: Respiratory  2/15/2022 0813 by Yuli Call, RN  Outcome: Progressing Towards Goal  2/14/2022 1849 by Yuli Call RN  Outcome: Progressing Towards Goal  Goal: Medications  2/15/2022 0813 by [place] Pump, RN  Outcome: Progressing Towards Goal  2/14/2022 1849 by [place] Pump, RN  Outcome: Progressing Towards Goal  Goal: Treatments/Interventions/Procedures  2/15/2022 0813 by [place] Pump, RN  Outcome: Progressing Towards Goal  2/14/2022 1849 by [place] Pump, RN  Outcome: Progressing Towards Goal  Goal: Discharge Planning  2/15/2022 0813 by [place] Pump, RN  Outcome: Progressing Towards Goal  2/14/2022 1849 by [place] Pump, RN  Outcome: Progressing Towards Goal     Problem: Colorectal Surgery:Day Five  Goal: Off Pathway (Use only if patient is Off Pathway)  2/15/2022 0813 by [place] Pump, RN  Outcome: Progressing Towards Goal  2/14/2022 1849 by [place] Pump, RN  Outcome: Progressing Towards Goal  Goal: *No signs and symptoms of infection or wound complications  4/22/1272 0813 by [place] Pump, RN  Outcome: Progressing Towards Goal  2/14/2022 1849 by [place] Pump, RN  Outcome: Progressing Towards Goal  Goal: *Optimal pain control at patient's stated goal  2/15/2022 0813 by [place] Pump, RN  Outcome: Progressing Towards Goal  2/14/2022 1849 by [place] Pump, RN  Outcome: Progressing Towards Goal  Goal: *Adequate urinary output (equal to or greater than 30 milliliters per hour)  2/15/2022 0813 by [place] Pump, RN  Outcome: Progressing Towards Goal  2/14/2022 1849 by [place] Pump, RN  Outcome: Progressing Towards Goal  Goal: *Lungs clear or at baseline  2/15/2022 0813 by [place] Pump, RN  Outcome: Progressing Towards Goal  2/14/2022 1849 by [place] Pump, RN  Outcome: Progressing Towards Goal  Goal: *Hemodynamically stable  2/15/2022 0813 by [place] Pump, RN  Outcome: Progressing Towards Goal  2/14/2022 1849 by [place] Pump, RN  Outcome: Progressing Towards Goal  Goal: *Tolerating diet  2/15/2022 0813 by [place] Pump, RN  Outcome: Progressing Towards Goal  2/14/2022 1849 by [place] Pump, RN  Outcome: Progressing Towards Goal  Goal: *Demonstrates progressive activity  2/15/2022 0813 by Amber Maldonado RN  Outcome: Progressing Towards Goal  2/14/2022 1849 by Amber Maldonado RN  Outcome: Progressing Towards Goal  Goal: Nutrition/Diet  2/15/2022 0813 by Amber Maldonado RN  Outcome: Progressing Towards Goal  2/14/2022 1849 by Amber Maldonado RN  Outcome: Progressing Towards Goal  Goal: Activity/Safety  2/15/2022 0813 by Amber Maldonado RN  Outcome: Progressing Towards Goal  2/14/2022 1849 by Amber Maldonado RN  Outcome: Progressing Towards Goal  Goal: Psychosocial  2/15/2022 0813 by Amber Maldonado RN  Outcome: Progressing Towards Goal  2/14/2022 1849 by Amber Maldonado RN  Outcome: Progressing Towards Goal  Goal: Respiratory  2/15/2022 0813 by Amber Maldonado RN  Outcome: Progressing Towards Goal  2/14/2022 1849 by Amber Maldonado RN  Outcome: Progressing Towards Goal  Goal: Medications  2/15/2022 0813 by Amber Maldonado RN  Outcome: Progressing Towards Goal  2/14/2022 1849 by Amber Maldonado RN  Outcome: Progressing Towards Goal  Goal: Treatments/Interventions/Procedures  2/15/2022 0813 by Amber Maldonado RN  Outcome: Progressing Towards Goal  2/14/2022 1849 by Amber Maldonado RN  Outcome: Progressing Towards Goal

## 2022-02-15 NOTE — WOUND CARE
Physical Exam   Room 508: stoma teaching session with pt & wife  Pt was able to demonstrate how to empty his own pouch in the toilet, gave encouragement & emotional support, gave verbal cues & hands-on help, but pt did most of the empty on his own. Went over supplies: provided with [employer] with his 4 items tabbed for easy ordering: wafers, pouches, allkare wipes, & barrier rings. We discussed his supplies, how to order, & questions answered. Extra supplies provided for pt for take home. Will have [employer] [facility] send starter kit to pt's home address. We discussed staying hydrated, how to continue to watch for high watery output in case imodium is needed from Dr. Ryley Barrios. We discussed diet, chewing food thoroughly, watch out for popcorn/nuts/seeds. Questions answered. Will turn over care to nursing staff at this time.   Alessandra Ball BSN, RN, [facility] [place], [address]

## 2022-02-15 NOTE — DISCHARGE SUMMARY
Colon and Rectal Surgery Discharge Summary     Patient: Juan Decker MRN: [id number]  SSN: [id number]    YOB: 1980  Age: 35 y.o. Sex: male       Admit Date: 2/11/2022    Discharge Date: 2/15/2022      Admission Diagnoses: Rectal cancer ([facility] [address]    Discharge Diagnoses: same    Procedure: Robotic LAR, diverting ileostomy    Problem List as of 2/15/2022 Date Reviewed: 1/6/2022          Codes Class Noted - Resolved    Rectal cancer ([facility] ICD-10-CM: C20  ICD-9-CM: 154.1  2/11/2022 - Present        Sinusitis acute ICD-9-CM: 461.9  1/18/2013 - Present               Discharge Condition: Good    Hospital Course: To OR for above. Post-op diet advanced. Remained afebrile. Stoma functioning. Ambulated. Consults: None    Significant Diagnostic Studies: NA    Disposition: home    Discharge Medications:   Current Discharge Medication List      START taking these medications    Details   acetaminophen (TYLENOL) 500 mg tablet Take 2 Tablets by mouth every six (6) hours for 7 days. Qty: 56 Tablet, Refills: 0      celecoxib (CELEBREX) 100 mg capsule Take 1 Capsule by mouth two (2) times a day for 7 days. Qty: 14 Capsule, Refills: 0      gabapentin (NEURONTIN) 300 mg capsule Take 1 Capsule by mouth three (3) times daily for 7 days. Max Daily Amount: 900 mg. Qty: 21 Capsule, Refills: 0    Associated Diagnoses: Rectal cancer (HCC)      oxyCODONE IR (ROXICODONE) 5 mg immediate release tablet Take 1 Tablet by mouth every four (4) hours as needed for Pain for up to 3 days. Max Daily Amount: 30 mg.  Qty: 40 Tablet, Refills: 0    Associated Diagnoses: Rectal cancer ([facility]         CONTINUE these medications which have NOT CHANGED    Details   docusate sodium (Colace) 100 mg capsule Take 100 mg by mouth as needed. benazepriL (LOTENSIN) 20 mg tablet 20 mg daily. amLODIPine (NORVASC) 5 mg tablet 5 mg daily.       ergocalciferol (ERGOCALCIFEROL) 1,250 mcg (50,000 unit) capsule Vitamin D2 50,000 unit capsule   TAKE ONE CAPSULE BY MOUTH ONCE WEEKLY      glimepiride (AMARYL) 2 mg tablet Take 2 mg by mouth every morning. metFORMIN ER (GLUCOPHAGE XR) 500 mg tablet 500 mg two (2) times a day. STOP taking these medications       ibuprofen (MOTRIN) 800 mg tablet Comments:   Reason for Stopping:               Activity: No heavy lifting for 6 weeks  Diet: Regular Diet  Wound Care: stoma care wound visiting nurse    Follow-up Appointments   Procedures    FOLLOW UP VISIT Appointment in: Two Weeks     Standing Status:   Standing     Number of Occurrences:   1     Order Specific Question:   Appointment in     Answer:    Two Weeks       Signed By: Tone Singh MD     February 15, 2022

## 2022-02-28 ENCOUNTER — OFFICE VISIT (OUTPATIENT)
Dept: SURGERY | Age: 42
End: 2022-02-28
Payer: COMMERCIAL

## 2022-02-28 VITALS
DIASTOLIC BLOOD PRESSURE: 76 MMHG | HEIGHT: 67 IN | WEIGHT: 216 LBS | SYSTOLIC BLOOD PRESSURE: 132 MMHG | HEART RATE: 99 BPM | TEMPERATURE: 97.4 F | BODY MASS INDEX: 33.9 KG/M2 | OXYGEN SATURATION: 97 % | RESPIRATION RATE: 18 BRPM

## 2022-02-28 DIAGNOSIS — C20 RECTAL CANCER (HCC): Primary | ICD-10-CM

## 2022-02-28 PROCEDURE — 99024 POSTOP FOLLOW-UP VISIT: CPT | Performed by: COLON & RECTAL SURGERY

## 2022-02-28 NOTE — LETTER
2/28/2022    Patient: Aleisha Salinas   YOB: 1980   Date of Visit: 2/28/2022     [facility] Nidia [address] 68  Philipp [address] [address]  Via Fax: [phone]     Kayleigh Atkinson MD  27 [facility]  [address]  [address] [address] [address]  Via Fax: [phone]     Sushil Hwang MD  [address] [place] [address]  [address]  200 [facility]  Via In [facility] [address]    Dear Henny Horowitz is seen in follow-up after robotic low anterior resection for an upper rectal cancer identified on recent colonoscopy. He did very well after surgery and was discharged without complication. Pathology revealed a T3N2 adenocarcinoma with 15+ lymph nodes. Surgical margins were widely negative. We reviewed his case at tumor board. Given that this was an upper rectal tumor completely within the abdominal cavity, the mesorectal excision was graded as excellent,  and the margins of the primary tumor are widely negative I have recommended foregoing radiation and commencing with adjuvant chemotherapy. We will schedule him for Mediport next week. If you have questions, please do not hesitate to call me. I look forward to following your patient along with you.       Sincerely,    Rebecca Cooper MD

## 2022-02-28 NOTE — PROGRESS NOTES
Subjective: Tolerating diet and stoma is functional.    Past medical history and ROS were reviewed and unchanged. Abdomen: Soft, nontender nondistended    Pathology consistent with T3N2 adenocarcinoma, 15 of 23+ lymph nodes, widely negative margins    Assessment / Plan    Status post robotic low anterior resection for T3N2 adenocarcinoma of the upper rectum, February 2022  Recommend adjuvant chemotherapy  Place Mediport  Discussed with Santo    The diagnoses and plan were discussed with patient. All questions answered. Plan of care agreed to by all concerned.

## 2022-03-04 NOTE — PERIOP NOTES
Yaneli Tejada' PAT phone assessment completed on 3/4/22. The following instructions were reviewed with Yaneli Tejada and he verbalized understanding. 1. Do NOT eat or drink anything, including candy, gum, or ice chips after midnight on 3/11/22, unless you have specific instructions from your surgeon or anesthesia provider to do so.  2. You may brush your teeth before coming to the hospital.  3. No smoking 24 hours prior to the day of surgery. 4. No alcohol 24 hours prior to the day of surgery. 5. No recreational drugs for one week prior to the day of surgery. 6. Leave all valuables, including money/purse, at home. 7. Remove all jewelry, nail polish, acrylic nails, and makeup (including mascara); no lotions powders, deodorant, or perfume/cologne/after shave on the skin. 8. Glasses/contact lenses and dentures may be worn to the hospital.  They will be removed prior to surgery. 9. Call your doctor if symptoms of a cold or illness develop within 24-48 hours prior to your surgery. 10.  AN ADULT MUST DRIVE YOU HOME AFTER OUTPATIENT SURGERY. 11.  If you are having an outpatient procedure, please make arrangements for a responsible adult to be with you for 24 hours after your surgery. Special Instructions:      Bring list of CURRENT medications. Bring any pertinent legal medical records.   Take these medications the morning of surgery with a sip of water:  Norvasc

## 2022-03-07 ENCOUNTER — HOSPITAL ENCOUNTER (EMERGENCY)
Age: 42
Discharge: HOME | End: 2022-03-08
Attending: STUDENT IN AN ORGANIZED HEALTH CARE EDUCATION/TRAINING PROGRAM
Payer: COMMERCIAL

## 2022-03-07 VITALS
RESPIRATION RATE: 17 BRPM | SYSTOLIC BLOOD PRESSURE: 148 MMHG | HEART RATE: 101 BPM | TEMPERATURE: 97.8 F | DIASTOLIC BLOOD PRESSURE: 90 MMHG | OXYGEN SATURATION: 97 %

## 2022-03-07 DIAGNOSIS — Z93.3 COLOSTOMY IN PLACE (HCC): Primary | ICD-10-CM

## 2022-03-07 PROCEDURE — 99283 EMERGENCY DEPT VISIT LOW MDM: CPT

## 2022-03-08 LAB
ALBUMIN SERPL-MCNC: 4.2 G/DL (ref 3.4–5)
ALBUMIN/GLOB SERPL: 0.9 {RATIO} (ref 0.8–1.7)
ALP SERPL-CCNC: 70 U/L (ref 45–117)
ALT SERPL-CCNC: 33 U/L (ref 16–61)
ANION GAP SERPL CALC-SCNC: 5 MMOL/L (ref 3–18)
APTT PPP: 28.3 SEC (ref 23–36.4)
AST SERPL-CCNC: 11 U/L (ref 10–38)
BASOPHILS # BLD: 0 K/UL (ref 0–0.1)
BASOPHILS NFR BLD: 1 % (ref 0–2)
BILIRUB SERPL-MCNC: 0.2 MG/DL (ref 0.2–1)
BUN SERPL-MCNC: 16 MG/DL (ref 7–18)
BUN/CREAT SERPL: 17 (ref 12–20)
CALCIUM SERPL-MCNC: 9.6 MG/DL (ref 8.5–10.1)
CHLORIDE SERPL-SCNC: 107 MMOL/L (ref 100–111)
CO2 SERPL-SCNC: 24 MMOL/L (ref 21–32)
CREAT SERPL-MCNC: 0.94 MG/DL (ref 0.6–1.3)
DIFFERENTIAL METHOD BLD: ABNORMAL
EOSINOPHIL # BLD: 0.3 K/UL (ref 0–0.4)
EOSINOPHIL NFR BLD: 4 % (ref 0–5)
ERYTHROCYTE [DISTWIDTH] IN BLOOD BY AUTOMATED COUNT: 19.9 % (ref 11.6–14.5)
GLOBULIN SER CALC-MCNC: 4.6 G/DL (ref 2–4)
GLUCOSE SERPL-MCNC: 129 MG/DL (ref 74–99)
HCT VFR BLD AUTO: 44.5 % (ref 36–48)
HGB BLD-MCNC: 13.9 G/DL (ref 13–16)
IMM GRANULOCYTES # BLD AUTO: 0 K/UL (ref 0–0.04)
IMM GRANULOCYTES NFR BLD AUTO: 0 % (ref 0–0.5)
INR PPP: 0.9 (ref 0.8–1.2)
LYMPHOCYTES # BLD: 2.3 K/UL (ref 0.9–3.6)
LYMPHOCYTES NFR BLD: 34 % (ref 21–52)
MCH RBC QN AUTO: 24.8 PG (ref 24–34)
MCHC RBC AUTO-ENTMCNC: 31.2 G/DL (ref 31–37)
MCV RBC AUTO: 79.5 FL (ref 78–100)
MONOCYTES # BLD: 0.7 K/UL (ref 0.05–1.2)
MONOCYTES NFR BLD: 10 % (ref 3–10)
NEUTS SEG # BLD: 3.4 K/UL (ref 1.8–8)
NEUTS SEG NFR BLD: 52 % (ref 40–73)
NRBC # BLD: 0 K/UL (ref 0–0.01)
NRBC BLD-RTO: 0 PER 100 WBC
PLATELET # BLD AUTO: 333 K/UL (ref 135–420)
PMV BLD AUTO: 9.9 FL (ref 9.2–11.8)
POTASSIUM SERPL-SCNC: 3.8 MMOL/L (ref 3.5–5.5)
PROT SERPL-MCNC: 8.8 G/DL (ref 6.4–8.2)
PROTHROMBIN TIME: 12.3 SEC (ref 11.5–15.2)
RBC # BLD AUTO: 5.6 M/UL (ref 4.35–5.65)
SODIUM SERPL-SCNC: 136 MMOL/L (ref 136–145)
WBC # BLD AUTO: 6.7 K/UL (ref 4.6–13.2)

## 2022-03-08 PROCEDURE — 85610 PROTHROMBIN TIME: CPT

## 2022-03-08 PROCEDURE — 85730 THROMBOPLASTIN TIME PARTIAL: CPT

## 2022-03-08 PROCEDURE — 85025 COMPLETE CBC W/AUTO DIFF WBC: CPT

## 2022-03-08 PROCEDURE — 80053 COMPREHEN METABOLIC PANEL: CPT

## 2022-03-08 NOTE — ED TRIAGE NOTES
Patient c/o problems with his ostomy bag states during a shower noticed some bleeding. Ostomy site is new, place Feb 11th 2022. Patient is awaiting ostomy supplies. Denies any discharge or abnormal drainage.

## 2022-03-08 NOTE — DISCHARGE INSTRUCTIONS
[employer] Activation    Thank you for requesting access to [facility]. Please follow the instructions below to securely access and download your online medical record. [facility] allows you to send messages to your doctor, view your test results, renew your prescriptions, schedule appointments, and more. How Do I Sign Up? In your internet browser, go to [email or web address]  Click on the First Time User? Click Here link in the Sign In box. You will be redirect to the New Member Sign Up page. Enter your [facility] Access Code exactly as it appears below. You will not need to use this code after youve completed the sign-up process. If you do not sign up before the expiration date, you must request a new code. [facility] Access Code: Activation code not generated  Current [facility] Status: Active (This is the date your [facility] access code will )    Enter the last four digits of your Social Security Number (xxxx) and Date of Birth (mm/dd/yyyy) as indicated and click Submit. You will be taken to the next sign-up page. Create a [facility] ID. This will be your [facility] login ID and cannot be changed, so think of one that is secure and easy to remember. Create a [facility] password. You can change your password at any time. Enter your Password Reset Question and Answer. This can be used at a later time if you forget your password. Enter your e-mail address. You will receive e-mail notification when new information is available in [address] Click Sign Up. You can now view and download portions of your medical record. Click the [place] link to download a portable copy of your medical information. Additional Information    If you have questions, please visit the Frequently Asked Questions section of the [facility] website at [email or web address] Remember, [facility] is NOT to be used for urgent needs. For medical emergencies, dial 911.

## 2022-03-08 NOTE — ED NOTES
I have reviewed discharge instructions with the patient. The patient verbalized understanding. Ileostomy bag changed. No further questions at this time.

## 2022-03-08 NOTE — ED PROVIDER NOTES
EMERGENCY DEPARTMENT HISTORY AND PHYSICAL EXAM    Date: 3/7/2022  Patient Name: Elias Hassan    History of Presenting Illness     Chief Complaint   Patient presents with    Other     ostomy bag problem         History Provided By: patient    Chief Complaint: bleeding from ostomy site  Duration: 1 day  Timing:  acute  Location: n/a  Quality: n/a  Severity: severe  Modifying Factors: n/a  Associated Symptoms: n/a    Additional History (Context): Elias Hassan is a 35 y.o. male with a pertinent past medical history of a brain aneurysm, rectal cancer, hypertension, and DM who presents with bleeding from the colostomy site that he noticed while in the shower earlier today. He states that he was recently diagnosed with rectal cancer and had a tumor removed last month. He is scheduled for port placement this week for chemo. He denies any family history of colon cancer. He states that the area around the colostomy bag feels slightly irritated and red. He denies any fever, chest pain, shortness of breath, nausea, or vomiting at this time. PCP: Sanford Dorsey, DO    Current Outpatient Medications   Medication Sig Dispense Refill    docusate sodium (Colace) 100 mg capsule Take 100 mg by mouth as needed.  benazepriL (LOTENSIN) 20 mg tablet 20 mg daily.  amLODIPine (NORVASC) 5 mg tablet 5 mg daily.  ergocalciferol (ERGOCALCIFEROL) 1,250 mcg (50,000 unit) capsule Vitamin D2 50,000 unit capsule   TAKE ONE CAPSULE BY MOUTH ONCE WEEKLY      glimepiride (AMARYL) 2 mg tablet Take 2 mg by mouth every morning.  metFORMIN ER (GLUCOPHAGE XR) 500 mg tablet 500 mg two (2) times a day. Past History     Past Medical History:  Past Medical History:   Diagnosis Date    Brain aneurysm     at 10years of age   [facility])     rectal cancer    COVID-19 01/13/2022    asymptomatic    Diabetes ([address]     Hypertension     Iron deficiency anemia     Seizure ([address]     post brain surgery while in ICU ONCE! TREATED WITH DILANTIN age 10    Seizures [facility])     age 10       Past Surgical History:  Past Surgical History:   Procedure Laterality Date    FLEXIBLE SIGMOIDOSCOPY N/A 11/24/2021    SIGMOIDOSCOPY FLEXIBLE performed by Debbi Presley MD [address] N/A 2/11/2022    FLEXIBLE SIGMOIDOSCOPY performed by Debbi Presley MD at [facility] OR    HX COLONOSCOPY      HX INTRACRANIAL ANEURYSM REPAIR      age 10       Family History:  No family history on file. Social History:  Social History     Tobacco Use    Smoking status: Never Smoker    Smokeless tobacco: Never Used   Vaping Use    Vaping Use: Never used   Substance Use Topics    Alcohol use: Yes     Comment: socially    Drug use: Yes     Types: Marijuana     Comment: marijuana 1/27/22       Allergies:  No Known Allergies      Review of Systems   Review of Systems   Constitutional: Negative for chills, diaphoresis, fatigue and fever. Respiratory: Negative for shortness of breath. Cardiovascular: Negative for chest pain and palpitations. Gastrointestinal: Positive for blood in stool. Negative for abdominal pain, anal bleeding, constipation, diarrhea, nausea, rectal pain and vomiting. Genitourinary: Negative for hematuria. Skin: Positive for color change (around colostomy bag ). Negative for rash and wound. Neurological: Negative for dizziness, light-headedness and headaches. All other systems reviewed and are negative. All Other Systems Negative  Physical Exam     Vitals:    03/07/22 2033   BP: (!) 148/90   Pulse: (!) 101   Resp: 17   Temp: 97.8 °F (36.6 °C)   SpO2: 97%     Physical Exam  Vitals and nursing note reviewed. Constitutional:       General: He is not in acute distress. Appearance: Normal appearance. He is well-developed and normal weight. He is not diaphoretic. HENT:      Head: Normocephalic and atraumatic. Eyes:      General: No scleral icterus. Right eye: No discharge. Left eye: No discharge. Conjunctiva/sclera: Conjunctivae normal.   Cardiovascular:      Rate and Rhythm: Normal rate and regular rhythm. Heart sounds: Normal heart sounds. No murmur heard. No friction rub. No gallop. Pulmonary:      Effort: Pulmonary effort is normal. No respiratory distress. Breath sounds: Normal breath sounds. No stridor. No wheezing, rhonchi or rales. Abdominal:      General: Bowel sounds are normal.      Palpations: Abdomen is soft. Tenderness: There is no abdominal tenderness. There is no guarding. Comments: Colostomy in place, no bleeding noted. Musculoskeletal:         General: Normal range of motion. Cervical back: Normal range of motion and neck supple. Skin:     General: Skin is warm and dry. Findings: No erythema or rash. Neurological:      General: No focal deficit present. Mental Status: He is alert and oriented to person, place, and time. Mental status is at baseline. Coordination: Coordination normal.      Comments: Gait is steady and patient exhibits no evidence of ataxia. Patient is able to ambulate without difficulty. No focal neurological deficit noted. No facial droop, slurred speech, or evidence of altered mentation noted on exam.     Psychiatric:         Behavior: Behavior normal.         Thought Content:  Thought content normal.                Diagnostic Study Results     Labs -     Recent Results (from the past 12 hour(s))   CBC WITH AUTOMATED DIFF    Collection Time: 03/08/22 12:08 AM   Result Value Ref Range    WBC 6.7 4.6 - 13.2 K/uL    RBC 5.60 4.35 - 5.65 M/uL    HGB 13.9 13.0 - 16.0 g/dL    HCT 44.5 36.0 - 48.0 %    MCV 79.5 78.0 - 100.0 FL    MCH 24.8 24.0 - 34.0 PG    MCHC 31.2 31.0 - 37.0 g/dL    RDW 19.9 (H) 11.6 - 14.5 %    PLATELET 635 324 - 057 K/uL    MPV 9.9 9.2 - 11.8 FL    NRBC 0.0 0  WBC    ABSOLUTE NRBC 0.00 0.00 - 0.01 K/uL    NEUTROPHILS 52 40 - 73 %    LYMPHOCYTES 34 21 - 52 %    MONOCYTES 10 3 - 10 %    EOSINOPHILS 4 0 - 5 %    BASOPHILS 1 0 - 2 %    IMMATURE GRANULOCYTES 0 0.0 - 0.5 %    ABS. NEUTROPHILS 3.4 1.8 - 8.0 K/UL    ABS. LYMPHOCYTES 2.3 0.9 - 3.6 K/UL    ABS. MONOCYTES 0.7 0.05 - 1.2 K/UL    ABS. EOSINOPHILS 0.3 0.0 - 0.4 K/UL    ABS. BASOPHILS 0.0 0.0 - 0.1 K/UL    ABS. IMM. GRANS. 0.0 0.00 - 0.04 K/UL    DF AUTOMATED     METABOLIC PANEL, COMPREHENSIVE    Collection Time: 03/08/22 12:08 AM   Result Value Ref Range    Sodium 136 136 - 145 mmol/L    Potassium 3.8 3.5 - 5.5 mmol/L    Chloride 107 100 - 111 mmol/L    CO2 24 21 - 32 mmol/L    Anion gap 5 3.0 - 18 mmol/L    Glucose 129 (H) 74 - 99 mg/dL    BUN 16 7.0 - 18 MG/DL    Creatinine 0.94 0.6 - 1.3 MG/DL    BUN/Creatinine ratio 17 12 - 20      GFR est AA >60 >60 ml/min/1.73m2    GFR est non-AA >60 >60 ml/min/1.73m2    Calcium 9.6 8.5 - 10.1 MG/DL    Bilirubin, total 0.2 0.2 - 1.0 MG/DL    ALT (SGPT) 33 16 - 61 U/L    AST (SGOT) 11 10 - 38 U/L    Alk. phosphatase 70 45 - 117 U/L    Protein, total 8.8 (H) 6.4 - 8.2 g/dL    Albumin 4.2 3.4 - 5.0 g/dL    Globulin 4.6 (H) 2.0 - 4.0 g/dL    A-G Ratio 0.9 0.8 - 1.7     PTT    Collection Time: 03/08/22 12:08 AM   Result Value Ref Range    aPTT 28.3 23.0 - 36.4 SEC   PROTHROMBIN TIME + INR    Collection Time: 03/08/22 12:08 AM   Result Value Ref Range    Prothrombin time 12.3 11.5 - 15.2 sec    INR 0.9 0.8 - 1.2         Radiologic Studies -   No orders to display     CT Results  (Last 48 hours)    None        CXR Results  (Last 48 hours)    None            Medical Decision Making   I am the first provider for this patient. I reviewed the vital signs, available nursing notes, past medical history, past surgical history, family history and social history. Vital Signs-Reviewed the patient's vital signs. Records Reviewed Melani Yuen PA-C     Procedures:  Procedures    Provider Notes (Medical Decision Making):  Impression:  Colostomy in place    Labs essentially unremarkable, pt has no abd pain or TTP on exam. States he has an apt with his oncologist tomorrow. Will plan to d.c with close follow-up. Return precautions advised. Pt agrees. Melani Yuen PA-C     MED RECONCILIATION:  No current facility-administered medications for this encounter. Current Outpatient Medications   Medication Sig    docusate sodium (Colace) 100 mg capsule Take 100 mg by mouth as needed.  benazepriL (LOTENSIN) 20 mg tablet 20 mg daily.  amLODIPine (NORVASC) 5 mg tablet 5 mg daily.  ergocalciferol (ERGOCALCIFEROL) 1,250 mcg (50,000 unit) capsule Vitamin D2 50,000 unit capsule   TAKE ONE CAPSULE BY MOUTH ONCE WEEKLY    glimepiride (AMARYL) 2 mg tablet Take 2 mg by mouth every morning.  metFORMIN ER (GLUCOPHAGE XR) 500 mg tablet 500 mg two (2) times a day. Disposition:  D/c    DISCHARGE NOTE:   Patient is stable for discharge at this time. I have discussed all the findings from today's work up with the patient, including lab results and imaging. I have answered all questions. No new rx given. Rest and close follow-up with the PCP recommended this week. Return to the ED immediately for any new or worsening symptoms. Melani Menchaca PA-C     Follow-up Information     Follow up With Specialties Details Why Contact Info    your oncologist  In 1 day      [facility] EMERGENCY DEPT Emergency Medicine  As needed, If symptoms worsen [address] [facility] [address]  [phone]          Current Discharge Medication List            Diagnosis     Clinical Impression:   1.  Colostomy in place [facility])

## 2022-03-10 ENCOUNTER — ANESTHESIA EVENT (OUTPATIENT)
Dept: SURGERY | Age: 42
End: 2022-03-10
Payer: COMMERCIAL

## 2022-03-11 ENCOUNTER — APPOINTMENT (OUTPATIENT)
Dept: GENERAL RADIOLOGY | Age: 42
End: 2022-03-11
Attending: COLON & RECTAL SURGERY
Payer: COMMERCIAL

## 2022-03-11 ENCOUNTER — ANESTHESIA (OUTPATIENT)
Dept: SURGERY | Age: 42
End: 2022-03-11
Payer: COMMERCIAL

## 2022-03-11 ENCOUNTER — HOSPITAL ENCOUNTER (OUTPATIENT)
Age: 42
Setting detail: OUTPATIENT SURGERY
Discharge: HOME | End: 2022-03-11
Attending: COLON & RECTAL SURGERY | Admitting: COLON & RECTAL SURGERY
Payer: COMMERCIAL

## 2022-03-11 VITALS
HEART RATE: 82 BPM | SYSTOLIC BLOOD PRESSURE: 111 MMHG | BODY MASS INDEX: 33.59 KG/M2 | DIASTOLIC BLOOD PRESSURE: 75 MMHG | TEMPERATURE: 97.2 F | OXYGEN SATURATION: 99 % | WEIGHT: 214 LBS | RESPIRATION RATE: 20 BRPM | HEIGHT: 67 IN

## 2022-03-11 DIAGNOSIS — C20 RECTAL CANCER (HCC): Primary | ICD-10-CM

## 2022-03-11 LAB
AMPHET UR QL SCN: NEGATIVE
BARBITURATES UR QL SCN: NEGATIVE
BENZODIAZ UR QL: NEGATIVE
CANNABINOIDS UR QL SCN: NEGATIVE
COCAINE UR QL SCN: NEGATIVE
GLUCOSE BLD STRIP.AUTO-MCNC: 131 MG/DL (ref 70–110)
GLUCOSE BLD STRIP.AUTO-MCNC: 99 MG/DL (ref 70–110)
HDSCOM,HDSCOM: NORMAL
METHADONE UR QL: NEGATIVE
OPIATES UR QL: NEGATIVE
PCP UR QL: NEGATIVE

## 2022-03-11 PROCEDURE — 00532 ANES ACCESS CTR VENOUS CRCJ: CPT | Performed by: NURSE ANESTHETIST, CERTIFIED REGISTERED

## 2022-03-11 PROCEDURE — 74011000250 HC RX REV CODE- 250: Performed by: COLON & RECTAL SURGERY

## 2022-03-11 PROCEDURE — 76210000006 HC OR PH I REC 0.5 TO 1 HR: Performed by: COLON & RECTAL SURGERY

## 2022-03-11 PROCEDURE — 74011250636 HC RX REV CODE- 250/636: Performed by: COLON & RECTAL SURGERY

## 2022-03-11 PROCEDURE — 74011250636 HC RX REV CODE- 250/636: Performed by: NURSE ANESTHETIST, CERTIFIED REGISTERED

## 2022-03-11 PROCEDURE — 76210000022 HC REC RM PH II 1.5 TO 2 HR: Performed by: COLON & RECTAL SURGERY

## 2022-03-11 PROCEDURE — 36561 INSERT TUNNELED CV CATH: CPT | Performed by: COLON & RECTAL SURGERY

## 2022-03-11 PROCEDURE — 77030040361 HC SLV COMPR DVT MDII -B: Performed by: COLON & RECTAL SURGERY

## 2022-03-11 PROCEDURE — 76010000138 HC OR TIME 0.5 TO 1 HR: Performed by: COLON & RECTAL SURGERY

## 2022-03-11 PROCEDURE — 74011250637 HC RX REV CODE- 250/637: Performed by: COLON & RECTAL SURGERY

## 2022-03-11 PROCEDURE — 76060000032 HC ANESTHESIA 0.5 TO 1 HR: Performed by: COLON & RECTAL SURGERY

## 2022-03-11 PROCEDURE — 77030031139 HC SUT VCRL2 J&J -A: Performed by: COLON & RECTAL SURGERY

## 2022-03-11 PROCEDURE — 82962 GLUCOSE BLOOD TEST: CPT

## 2022-03-11 PROCEDURE — 77001 FLUOROGUIDE FOR VEIN DEVICE: CPT | Performed by: COLON & RECTAL SURGERY

## 2022-03-11 PROCEDURE — 74011250637 HC RX REV CODE- 250/637: Performed by: NURSE ANESTHETIST, CERTIFIED REGISTERED

## 2022-03-11 PROCEDURE — 71045 X-RAY EXAM CHEST 1 VIEW: CPT

## 2022-03-11 PROCEDURE — C1788 PORT, INDWELLING, IMP: HCPCS | Performed by: COLON & RECTAL SURGERY

## 2022-03-11 PROCEDURE — 00532 ANES ACCESS CTR VENOUS CRCJ: CPT | Performed by: ANESTHESIOLOGY

## 2022-03-11 PROCEDURE — 2709999900 HC NON-CHARGEABLE SUPPLY: Performed by: COLON & RECTAL SURGERY

## 2022-03-11 PROCEDURE — 80307 DRUG TEST PRSMV CHEM ANLYZR: CPT

## 2022-03-11 PROCEDURE — 77030040922 HC BLNKT HYPOTHRM STRY -A: Performed by: COLON & RECTAL SURGERY

## 2022-03-11 PROCEDURE — 77030002933 HC SUT MCRYL J&J -A: Performed by: COLON & RECTAL SURGERY

## 2022-03-11 DEVICE — PORT INFUS 8FR PLAS ATTCH OPN END POLYUR CATH SIL FILL SUT
Type: IMPLANTABLE DEVICE | Site: CHEST | Status: NON-FUNCTIONAL
Removed: 2022-10-21

## 2022-03-11 RX ORDER — OXYCODONE AND ACETAMINOPHEN 5; 325 MG/1; MG/1
1 TABLET ORAL
Qty: 20 TABLET | Refills: 0 | Status: SHIPPED | OUTPATIENT
Start: 2022-03-11 | End: 2022-03-18

## 2022-03-11 RX ORDER — OXYCODONE AND ACETAMINOPHEN 5; 325 MG/1; MG/1
1 TABLET ORAL ONCE
Status: COMPLETED | OUTPATIENT
Start: 2022-03-11 | End: 2022-03-11

## 2022-03-11 RX ORDER — LIDOCAINE HYDROCHLORIDE 10 MG/ML
INJECTION, SOLUTION EPIDURAL; INFILTRATION; INTRACAUDAL; PERINEURAL AS NEEDED
Status: DISCONTINUED | OUTPATIENT
Start: 2022-03-11 | End: 2022-03-11 | Stop reason: HOSPADM

## 2022-03-11 RX ORDER — SODIUM CHLORIDE 0.9 % (FLUSH) 0.9 %
5-40 SYRINGE (ML) INJECTION AS NEEDED
Status: CANCELLED | OUTPATIENT
Start: 2022-03-11

## 2022-03-11 RX ORDER — SODIUM CHLORIDE, SODIUM LACTATE, POTASSIUM CHLORIDE, CALCIUM CHLORIDE 600; 310; 30; 20 MG/100ML; MG/100ML; MG/100ML; MG/100ML
75 INJECTION, SOLUTION INTRAVENOUS CONTINUOUS
Status: CANCELLED | OUTPATIENT
Start: 2022-03-11 | End: 2022-03-11

## 2022-03-11 RX ORDER — SODIUM CHLORIDE 0.9 % (FLUSH) 0.9 %
5-40 SYRINGE (ML) INJECTION EVERY 8 HOURS
Status: CANCELLED | OUTPATIENT
Start: 2022-03-11

## 2022-03-11 RX ORDER — HYDROMORPHONE HYDROCHLORIDE 2 MG/ML
0.5 INJECTION, SOLUTION INTRAMUSCULAR; INTRAVENOUS; SUBCUTANEOUS AS NEEDED
Status: CANCELLED | OUTPATIENT
Start: 2022-03-11

## 2022-03-11 RX ORDER — PROPOFOL 10 MG/ML
INJECTION, EMULSION INTRAVENOUS AS NEEDED
Status: DISCONTINUED | OUTPATIENT
Start: 2022-03-11 | End: 2022-03-11 | Stop reason: HOSPADM

## 2022-03-11 RX ORDER — MIDAZOLAM HYDROCHLORIDE 1 MG/ML
INJECTION, SOLUTION INTRAMUSCULAR; INTRAVENOUS AS NEEDED
Status: DISCONTINUED | OUTPATIENT
Start: 2022-03-11 | End: 2022-03-11 | Stop reason: HOSPADM

## 2022-03-11 RX ORDER — PROPOFOL 10 MG/ML
VIAL (ML) INTRAVENOUS
Status: DISCONTINUED | OUTPATIENT
Start: 2022-03-11 | End: 2022-03-11 | Stop reason: HOSPADM

## 2022-03-11 RX ORDER — ONDANSETRON 2 MG/ML
4 INJECTION INTRAMUSCULAR; INTRAVENOUS ONCE
Status: CANCELLED | OUTPATIENT
Start: 2022-03-11 | End: 2022-03-11

## 2022-03-11 RX ORDER — INSULIN LISPRO 100 [IU]/ML
INJECTION, SOLUTION INTRAVENOUS; SUBCUTANEOUS ONCE
Status: DISCONTINUED | OUTPATIENT
Start: 2022-03-11 | End: 2022-03-11 | Stop reason: HOSPADM

## 2022-03-11 RX ORDER — MAGNESIUM SULFATE 100 %
4 CRYSTALS MISCELLANEOUS AS NEEDED
Status: CANCELLED | OUTPATIENT
Start: 2022-03-11

## 2022-03-11 RX ORDER — INSULIN LISPRO 100 [IU]/ML
INJECTION, SOLUTION INTRAVENOUS; SUBCUTANEOUS ONCE
Status: CANCELLED | OUTPATIENT
Start: 2022-03-11 | End: 2022-03-11

## 2022-03-11 RX ORDER — FAMOTIDINE 20 MG/1
20 TABLET, FILM COATED ORAL ONCE
Status: COMPLETED | OUTPATIENT
Start: 2022-03-11 | End: 2022-03-11

## 2022-03-11 RX ORDER — FENTANYL CITRATE 50 UG/ML
INJECTION, SOLUTION INTRAMUSCULAR; INTRAVENOUS AS NEEDED
Status: DISCONTINUED | OUTPATIENT
Start: 2022-03-11 | End: 2022-03-11 | Stop reason: HOSPADM

## 2022-03-11 RX ORDER — DEXTROSE MONOHYDRATE 100 MG/ML
250 INJECTION, SOLUTION INTRAVENOUS ONCE
Status: CANCELLED | OUTPATIENT
Start: 2022-03-11 | End: 2022-03-11

## 2022-03-11 RX ORDER — SODIUM CHLORIDE, SODIUM LACTATE, POTASSIUM CHLORIDE, CALCIUM CHLORIDE 600; 310; 30; 20 MG/100ML; MG/100ML; MG/100ML; MG/100ML
25 INJECTION, SOLUTION INTRAVENOUS CONTINUOUS
Status: DISCONTINUED | OUTPATIENT
Start: 2022-03-11 | End: 2022-03-11 | Stop reason: HOSPADM

## 2022-03-11 RX ADMIN — MIDAZOLAM HYDROCHLORIDE 2 MG: 2 INJECTION, SOLUTION INTRAMUSCULAR; INTRAVENOUS at 07:29

## 2022-03-11 RX ADMIN — FAMOTIDINE 20 MG: 20 TABLET, FILM COATED ORAL at 06:17

## 2022-03-11 RX ADMIN — PROPOFOL 50 MG: 10 INJECTION, EMULSION INTRAVENOUS at 07:37

## 2022-03-11 RX ADMIN — FENTANYL CITRATE 100 MCG: 50 INJECTION, SOLUTION INTRAMUSCULAR; INTRAVENOUS at 07:29

## 2022-03-11 RX ADMIN — PROPOFOL 75 MCG/KG/MIN: 10 INJECTION, EMULSION INTRAVENOUS at 07:37

## 2022-03-11 RX ADMIN — SODIUM CHLORIDE, SODIUM LACTATE, POTASSIUM CHLORIDE, AND CALCIUM CHLORIDE 25 ML/HR: 600; 310; 30; 20 INJECTION, SOLUTION INTRAVENOUS at 06:17

## 2022-03-11 RX ADMIN — CEFAZOLIN SODIUM 2 G: 1 INJECTION, POWDER, FOR SOLUTION INTRAMUSCULAR; INTRAVENOUS at 07:29

## 2022-03-11 RX ADMIN — OXYCODONE AND ACETAMINOPHEN 1 TABLET: 5; 325 TABLET ORAL at 10:26

## 2022-03-11 NOTE — H&P
HPI: Jung Desir is a 35 y.o. male presenting with chief complain of rectal cancer. Past Medical History:   Diagnosis Date    Brain aneurysm     at 10years of age   [facility])     rectal cancer    COVID-19 01/13/2022    asymptomatic    [address] [facility]     Hypertension     Iron deficiency anemia     Se[facility] ([facility]     post brain surgery while in ICU ONCE! TREATED WITH DILANTIN age 10    Seizures ([facility]     age 10       Past Surgical History:   Procedure Laterality Date    FLEXIBLE SIGMOIDOSCOPY N/A 11/24/2021    SIGMOIDOSCOPY FLEXIBLE performed by Tj Copeland MD [address] N/A 2/11/2022    FLEXIBLE SIGMOIDOSCOPY performed by Tj Copeland MD at [facility] OR    HX COLONOSCOPY      HX INTRACRANIAL ANEURYSM REPAIR      age 10       History reviewed. No pertinent family history. Social History     Socioeconomic History    Marital status:    Tobacco Use    Smoking status: Never Smoker    Smokeless tobacco: Never Used   Vaping Use    Vaping Use: Never used   Substance and Sexual Activity    Alcohol use: Yes     Comment: socially    Drug use: Yes     Types: Marijuana     Comment: marijuana 1/27/22       Review of Systems - neg    Outpatient Medications Marked as Taking for the 3/11/22 encounter [facility] Encounter)   Medication Sig Dispense Refill    docusate sodium (Colace) 100 mg capsule Take 100 mg by mouth as needed.  benazepriL (LOTENSIN) 20 mg tablet 20 mg daily.  amLODIPine (NORVASC) 5 mg tablet 5 mg daily.  ergocalciferol (ERGOCALCIFEROL) 1,250 mcg (50,000 unit) capsule Vitamin D2 50,000 unit capsule   TAKE ONE CAPSULE BY MOUTH ONCE WEEKLY      glimepiride (AMARYL) 2 mg tablet Take 2 mg by mouth every morning.  metFORMIN ER (GLUCOPHAGE XR) 500 mg tablet 500 mg two (2) times a day.          No Known Allergies    Vitals:    03/04/22 0935 03/11/22 0621   BP:  124/83   Pulse:  96   Resp:  20   Temp:  98.2 °F (36.8 °C) SpO2:  97%   Weight: 98.4 kg (217 lb) 97.1 kg (214 lb)   Height: 5' 7\" (1.702 m) 5' 7\" (1.702 m)       Physical Exam  Constitutional:       Appearance: He is well-developed. HENT:      Head: Normocephalic and atraumatic. Eyes:      Conjunctiva/sclera: Conjunctivae normal.   Abdominal:      General: There is no distension. Palpations: Abdomen is soft. There is no mass. Tenderness: There is no abdominal tenderness. Musculoskeletal:         General: Normal range of motion. Lymphadenopathy:      Cervical: No cervical adenopathy. Skin:     General: Skin is warm and dry. Neurological:      Sensory: No sensory deficit. Psychiatric:         Speech: Speech normal.         Assessment / Plan    mediport    The diagnoses and plan were discussed with the patient. All questions answered. Plan of care agreed to by all concerned.

## 2022-03-11 NOTE — ANESTHESIA PREPROCEDURE EVALUATION
Relevant Problems   No relevant active problems       Anesthetic History   No history of anesthetic complications            Review of Systems / Medical History  Patient summary reviewed and pertinent labs reviewed    Pulmonary  Within defined limits                 Neuro/Psych     seizures (At age 10 due to Cerebral aneurysm)         Cardiovascular    Hypertension: well controlled              Exercise tolerance: >4 METS     GI/Hepatic/Renal  Within defined limits              Endo/Other    Diabetes: well controlled, type 2    Cancer (Rectal cancer)     Other Findings              Physical Exam    Airway  Mallampati: II  TM Distance: 4 - 6 cm  Neck ROM: normal range of motion   Mouth opening: Normal     Cardiovascular  Regular rate and rhythm,  S1 and S2 normal,  no murmur, click, rub, or gallop             Dental      Comments: Several chipped   Pulmonary  Breath sounds clear to auscultation               Abdominal  GI exam deferred       Other Findings            Anesthetic Plan    ASA: 3  Anesthesia type: MAC and general - backup          Induction: Intravenous  Anesthetic plan and risks discussed with: Patient

## 2022-03-11 NOTE — OP NOTES
[facility]  OPERATIVE REPORT    Name:  Tawnya Apgar  MR#:   [id number]  :  1980  ACCOUNT #:  [de-identified]  DATE OF SERVICE:  2022    PREOPERATIVE DIAGNOSIS:  Rectal cancer. POSTOPERATIVE DIAGNOSIS:  Rectal cancer. PROCEDURE PERFORMED:  MediPort insertion, right subclavian vein with C-arm guidance. SURGEON:  Betito Tabares MD    ASSISTANT:  Osvaldo Ricks    ANESTHESIA:  MAC.    COMPLICATIONS:  None. SPECIMENS REMOVED:  None. IMPLANTS:  MediPort device. ESTIMATED BLOOD LOSS:  Minimal.    FINDINGS:  Right subclavian vein. INDICATIONS:  The patient is a 75-year-old male with rectal cancer brought to the operating room for MediPort insertion for chemotherapy. I explained the risks including bleeding, infection, and injury to lung. He understood and wished to proceed. PROCEDURE:  The patient was properly identified in the holding area and brought to the operating room. He was laid supine on the operating room table with right arm tucked. Chest was prepped and draped in the usual sterile fashion. We injected local anesthetic, inserted a large-bore needle underneath the right clavicle and gained access on first stick to the right subclavian vein. We fed a wire through this confirmed fluoroscopically to be at the atriocaval junction. We made an incision 3 cm beneath this with a 15-blade, carried this down through subcutaneous tissue and created a port pocket. We tunneled the catheter between the needle insertion site and the MediPort site. We placed the dilator and peel-away sheath over the wire and after confirming fluoroscopically this to be in the superior vena cava pulled out the inner dilator and wire. We then fed the catheter over the peel-away sheath and peeled away the sheath. We withdrew the MediPort catheter under fluoroscopic guidance until it appeared at the atriocaval junction.   We then trimmed it to size and attached it to the MediPort device with overlocking boot. We sewed this into the MediPort pocket with 3-0 Vicryl suture. The port was then accessed. It withdrew blood easily and was flushed with heparinized saline. We reconfirmed proper placement fluoroscopically. We then closed the incision sites with 4-0 Monocryl subcuticular suture. Steri-Strips, dry sterile dressing, and Tegaderm were applied. The patient tolerated the procedure well. All instrument, sponge, and needle counts were correct at the end of case x2. The patient awoke from anesthesia and was transported to PACU in stable condition.       MD DELIA Hernandez [email or web address]  D:  03/11/2022 8:16  T:  03/11/2022 9:52  JOB #:  [id number]

## 2022-03-11 NOTE — ROUTINE PROCESS
0824Patient arrived via stretcher from OR with OR nurse and CRNA. Attached to monitor, vital signs obtained and documented in the chart. Right chest dressing c/d/i, patient alert and oriented x4 on arrival to PACU. Blood glucose obtained and present on chart. 0855-Dr. Sumaya Marinelli present at bedside, chest x-ray reviewed. MD approved patient for transfer to [facility]. Requested to hold discharge from [facility] until radiology report of chest x-ray present on chart.

## 2022-03-11 NOTE — PROGRESS NOTES
conducted a pre-surgery visit with Morro Carbajal, who is a 35 y.o.,male. The  provided the following Interventions:  Initiated a relationship of care and support. Offered prayer and assurance of continued prayers on patient's behalf. There is no advance directive for this patient. Plan:  Chaplains will continue to follow and will provide pastoral care on an as needed/requested basis.  recommends bedside caregivers page  on duty if patient shows signs of acute spiritual or emotional distress.     Reiseñor 3   Board Certified [address] [place] [address]   [phone]

## 2022-03-11 NOTE — DISCHARGE INSTRUCTIONS
[facility] Discharge Instructions    OK to shower in 48 hours  Remove outer dressing in 5 days if not already removed by your oncologist  Let steri strips fall off on their own  If steri strips have not fallen off in 2 weeks may remove in shower  Percocet for pain    Patient Education        Implanted Port: What to Expect at [facility]. had a procedure to implant a port. A port is a device placed, in most cases, under the skin of your chest below your collarbone. It is made of plastic, stainless steel, or titanium. It's about the size of a quarter, but thicker. It looks like a small bump under your skin. A thin, flexible tube called a catheter runs under the skin from the port into a large vein. With the port, you will be able to get medicines (such as chemotherapy) with more comfort. You also can get blood, nutrients, or other fluids. Blood can be taken through the port for tests. You will probably have some discomfort and bruising at the port site. This will go away in a few days. The port can be used right away. You may have the port for weeks, months, or longer. Your port will need to be flushed out regularly to keep it open. A nurse or other health professional will do this for you. This care sheet gives you a general idea about how long it will take for you to recover. But each person recovers at a different pace. Follow the steps below to feel better as quickly as possible. How can you care for yourself at home? Activity    · Avoid arm and upper body movements that may pull on the catheter for the first few days. These movements include heavy weight lifting and vigorous use of your arms.     · You will probably need to take 1 day off from work and will be able to return to normal activities shortly after. This depends on the type of work you do, why you have the catheter, and how you feel.     · You probably will be able to take baths and swim. But you may need to avoid some activities. Talk to your doctor about any limits on your activity.     · Ask your doctor when you can drive again. Be careful when you pull your seat belt across your chest so it doesn't pull out the catheter. It's okay if the seat belt lays over the catheter. Medicines    · Your doctor will tell you if and when you can restart your medicines. He or she will also give you instructions about taking any new medicines.     · If you take aspirin or some other blood thinner, ask your doctor if and when to start taking it again. Make sure that you understand exactly what your doctor wants you to do.     · Be safe with medicines. Read and follow all instructions on the label. ? If the doctor gave you a prescription medicine for pain, take it as prescribed. ? If you are not taking a prescription pain medicine, ask your doctor if you can take an over-the-counter medicine. Incision care    · If you have a bandage, your doctor will tell you when you can remove it. After you remove the bandage, you may shower. Wash the area with soap and water and pat it dry. Don't use hydrogen peroxide or alcohol, which can slow healing. You may cover the area with a gauze bandage if it weeps or rubs against clothing. Change the bandage every day.     · If you have strips of tape on the cut (incision) the doctor made, leave the tape on for a week or until it falls off. Other instructions    · Always carry the medical alert card that your doctor gives you. It contains information about your port. It will tell health care workers that you have a port in case you need emergency care.     · When you get dressed, be careful not to rub the port. Do not wear a bra or suspenders that irritate your skin near the port. Follow-up care is a key part of your treatment and safety. Be sure to make and go to all appointments, and call your doctor if you are having problems.  It's also a good idea to know your test results and keep a list of the medicines you take.  When should you call for help? Call your doctor now or seek immediate medical care if:    · You have signs of infection, such as:  ? Increased pain, swelling, warmth, or redness. ? Red streaks leading from the area. ? Pus draining from the area. ? A fever.     · You have pain or swelling in your neck or arm.     · You have trouble breathing. Watch closely for changes in your health, and be sure to contact your doctor if:    · You have any problems with your line or port. Where can you learn more? Go to http://www.gray.com/  Enter M256 in the search box to learn more about \"Implanted Port: What to Expect at Home. \"  Current as of: July 1, 2021               Content Version: 13.2  © 2006-2022 [employer]. Care instructions adapted under license by [employer] (which disclaims liability or warranty for this information). If you have questions about a medical condition or this instruction, always ask your healthcare professional. [place] [address] any warranty or liability for your use of this information. DISCHARGE SUMMARY from Nurse    PATIENT INSTRUCTIONS:    After general anesthesia or intravenous sedation, for 24 hours or while taking prescription Narcotics:  · Limit your activities  · Do not drive and operate hazardous machinery  · Do not make important personal or business decisions  · Do  not drink alcoholic beverages  · If you have not urinated within 8 hours after discharge, please contact your surgeon on call.     Report the following to your surgeon:  · Excessive pain, swelling, redness or odor of or around the surgical area  · Temperature over 100.5  · Nausea and vomiting lasting longer than 4 hours or if unable to take medications  · Any signs of decreased circulation or nerve impairment to extremity: change in color, persistent  numbness, tingling, coldness or increase pain  · Any questions    What to do at Home:      *  Please give a list of your current medications to your Primary Care Provider. *  Please update this list whenever your medications are discontinued, doses are      changed, or new medications (including over-the-counter products) are added. *  Please carry medication information at all times in case of emergency situations. These are general instructions for a healthy lifestyle:    No smoking/ No tobacco products/ Avoid exposure to second hand smoke  Surgeon General's Warning:  Quitting smoking now greatly reduces serious risk to your health. Obesity, smoking, and sedentary lifestyle greatly increases your risk for illness    A healthy diet, regular physical exercise & weight monitoring are important for maintaining a healthy lifestyle    You may be retaining fluid if you have a history of heart failure or if you experience any of the following symptoms:  Weight gain of 3 pounds or more overnight or 5 pounds in a week, increased swelling in our hands or feet or shortness of breath while lying flat in bed. Please call your doctor as soon as you notice any of these symptoms; do not wait until your next office visit. The discharge information has been reviewed with the patient. The patient verbalized understanding. Discharge medications reviewed with the patient and appropriate educational materials and side effects teaching were provided.   ___________________________________________________________________________________________________________________________________

## 2022-03-11 NOTE — BRIEF OP NOTE
Brief Postoperative Note    Patient: Eden Ngo  YOB: 1980  MRN: [id number]    Date of Procedure: 3/11/2022     Pre-Op Diagnosis: Rectal cancer ([address] [C20]    Post-Op Diagnosis: Same as preoperative diagnosis. Procedure(s): MEDIPORT PLACEMENT/C-ARM    Surgeon(s):  Annmarie Frank MD    Surgical Assistant: Surg Asst-1: Jermaine Oates    Anesthesia: MAC     Estimated Blood Loss (mL): Minimal    Complications: None    Specimens: * No specimens in log *     Implants:   Implant Name Type Inv.  Item Serial No.  Lot No. LRB No. Used Action   KIT INTERMED XPORT PWR 8FR --  - [id number]  KIT INTERMED XPORT PWR 8FR --   BARD PERIPHERAL VASCULAR_WD [id number] Right 1 Implanted       Drains: * No LDAs found *    Findings: R SC vein    [id number]    Electronically Signed by Yennifer Colbert MD on 3/11/2022 at 8:13 AM

## 2022-03-11 NOTE — ANESTHESIA POSTPROCEDURE EVALUATION
Procedure(s): MEDIPORT PLACEMENT/C-ARM. MAC, general - backup    Anesthesia Post Evaluation      Multimodal analgesia: multimodal analgesia used between 6 hours prior to anesthesia start to PACU discharge  Patient location during evaluation: bedside  Patient participation: complete - patient participated  Level of consciousness: awake  Pain score: 0  Pain management: adequate  Airway patency: patent  Anesthetic complications: no  Cardiovascular status: stable  Respiratory status: acceptable  Hydration status: acceptable  Post anesthesia nausea and vomiting:  controlled  Final Post Anesthesia Temperature Assessment:  Normothermia (36.0-37.5 degrees C)      INITIAL Post-op Vital signs:   Vitals Value Taken Time   /76 03/11/22 0854   Temp 36.3 °C (97.4 °F) 03/11/22 0850   Pulse 92 03/11/22 0859   Resp 20 03/11/22 0850   SpO2 100 % 03/11/22 0859   Vitals shown include unvalidated device data.

## 2022-03-20 PROBLEM — C20 RECTAL CANCER (HCC): Status: ACTIVE | Noted: 2022-02-11

## 2022-04-04 ENCOUNTER — OFFICE VISIT (OUTPATIENT)
Dept: SURGERY | Age: 42
End: 2022-04-04
Payer: COMMERCIAL

## 2022-04-04 VITALS
DIASTOLIC BLOOD PRESSURE: 77 MMHG | HEIGHT: 67 IN | TEMPERATURE: 97.7 F | RESPIRATION RATE: 18 BRPM | BODY MASS INDEX: 32.65 KG/M2 | SYSTOLIC BLOOD PRESSURE: 147 MMHG | WEIGHT: 208 LBS | HEART RATE: 91 BPM | OXYGEN SATURATION: 98 %

## 2022-04-04 DIAGNOSIS — C20 RECTAL CANCER (HCC): Primary | ICD-10-CM

## 2022-04-04 PROCEDURE — 99024 POSTOP FOLLOW-UP VISIT: CPT | Performed by: COLON & RECTAL SURGERY

## 2022-04-04 NOTE — PROGRESS NOTES
Subjective: Tolerating chemo. No issues with Mediport. Past medical history and ROS were reviewed and unchanged. Abdomen: Soft nontender nondistended  Ileostomy appliance intact  Mediport insertion site healthy    Assessment / Plan    Status post robotic low anterior resection February 2022 for low rectal cancer, now status post Mediport  Follow-up in September upon conclusion of chemotherapy to prepare for ileostomy reversal    The diagnoses and plan were discussed with patient. All questions answered. Plan of care agreed to by all concerned.

## 2022-06-06 ENCOUNTER — TRANSCRIBE ORDER (OUTPATIENT)
Dept: SCHEDULING | Age: 42
End: 2022-06-06

## 2022-06-06 DIAGNOSIS — C20 MALIGNANT NEOPLASM OF RECTUM (HCC): Primary | ICD-10-CM

## 2022-06-22 ENCOUNTER — HOSPITAL ENCOUNTER (OUTPATIENT)
Dept: CT IMAGING | Age: 42
Discharge: HOME | End: 2022-06-22
Attending: PHYSICIAN ASSISTANT
Payer: COMMERCIAL

## 2022-06-22 DIAGNOSIS — C20 MALIGNANT NEOPLASM OF RECTUM (HCC): ICD-10-CM

## 2022-06-22 LAB — CREAT UR-MCNC: 0.8 MG/DL (ref 0.6–1.3)

## 2022-06-22 PROCEDURE — 74011000636 HC RX REV CODE- 636

## 2022-06-22 PROCEDURE — 74177 CT ABD & PELVIS W/CONTRAST: CPT

## 2022-06-22 PROCEDURE — 82565 ASSAY OF CREATININE: CPT

## 2022-06-22 RX ADMIN — IOPAMIDOL 100 ML: 612 INJECTION, SOLUTION INTRAVENOUS at 11:58

## 2022-09-06 ENCOUNTER — TRANSCRIBE ORDER (OUTPATIENT)
Dept: SCHEDULING | Age: 42
End: 2022-09-06

## 2022-09-06 DIAGNOSIS — C20 PRIMARY MALIGNANT NEOPLASM OF RECTUM (HCC): Primary | ICD-10-CM

## 2022-09-08 ENCOUNTER — OFFICE VISIT (OUTPATIENT)
Dept: SURGERY | Age: 42
End: 2022-09-08
Payer: COMMERCIAL

## 2022-09-08 ENCOUNTER — HOSPITAL ENCOUNTER (OUTPATIENT)
Dept: LAB | Age: 42
Discharge: HOME | End: 2022-09-08
Payer: COMMERCIAL

## 2022-09-08 ENCOUNTER — HOSPITAL ENCOUNTER (OUTPATIENT)
Dept: CT IMAGING | Age: 42
Discharge: HOME | End: 2022-09-08
Attending: INTERNAL MEDICINE
Payer: COMMERCIAL

## 2022-09-08 VITALS
OXYGEN SATURATION: 97 % | RESPIRATION RATE: 18 BRPM | TEMPERATURE: 98 F | HEART RATE: 84 BPM | BODY MASS INDEX: 31.33 KG/M2 | SYSTOLIC BLOOD PRESSURE: 131 MMHG | HEIGHT: 67 IN | DIASTOLIC BLOOD PRESSURE: 77 MMHG | WEIGHT: 199.6 LBS

## 2022-09-08 DIAGNOSIS — C20 PRIMARY MALIGNANT NEOPLASM OF RECTUM (HCC): ICD-10-CM

## 2022-09-08 DIAGNOSIS — Z43.2 ATTENTION TO ILEOSTOMY (HCC): ICD-10-CM

## 2022-09-08 DIAGNOSIS — C20 RECTAL CANCER (HCC): Primary | ICD-10-CM

## 2022-09-08 DIAGNOSIS — C20 RECTAL CANCER (HCC): ICD-10-CM

## 2022-09-08 LAB — CREAT UR-MCNC: 0.7 MG/DL (ref 0.6–1.3)

## 2022-09-08 PROCEDURE — 74011000636 HC RX REV CODE- 636: Performed by: INTERNAL MEDICINE

## 2022-09-08 PROCEDURE — 99214 OFFICE O/P EST MOD 30 MIN: CPT | Performed by: COLON & RECTAL SURGERY

## 2022-09-08 PROCEDURE — 82565 ASSAY OF CREATININE: CPT

## 2022-09-08 PROCEDURE — 74177 CT ABD & PELVIS W/CONTRAST: CPT

## 2022-09-08 PROCEDURE — 93005 ELECTROCARDIOGRAM TRACING: CPT

## 2022-09-08 RX ADMIN — IOPAMIDOL 100 ML: 612 INJECTION, SOLUTION INTRAVENOUS at 07:52

## 2022-09-08 NOTE — PROGRESS NOTES
Subjective: Completed chemotherapy 2 weeks ago. Tolerating diet with good stoma function. Past medical history and ROS were reviewed and unchanged. Abdomen: Soft, nontender nondistended    Assessment / Plan    Status post robotic LAR with diverting loop ileostomy for stage III rectal cancer, no neoadjuvant therapy  Has completed chemotherapy  Schedule Gastrografin enema and flexible sigmoidoscopy  Schedule loop ileostomy reversal    30 minutes was spent in patient care. The diagnoses and plan were discussed with patient. All questions answered. Plan of care agreed to by all concerned.

## 2022-09-08 NOTE — LETTER
9/8/2022    Patient: Stacey Kendall   YOB: 1980   Date of Visit: 9/8/2022     [facility] [address]  [place] [address] [address]  Via Fax: [phone]     Pallavi Burr MD  [address] [facility]  [address]  [address] [address] [address]  Via Fax: [phone]     Jameson Lucio MD  [address] [place] [address]  [address]  [address] [facility]  [address] [facility] [address]    Dear Nika Demetrius is seen in follow-up after completion of chemotherapy for his upper rectal cancer. He tolerated this very well. We will evaluate the surgical anastomosis and then proceed with loop ileostomy reversal.  He is eager to proceed. If you have questions, please do not hesitate to call me. I look forward to following your patient along with you.       Sincerely,    Mark Balderrama MD

## 2022-09-09 LAB
ATRIAL RATE: 85 BPM
CALCULATED P AXIS, ECG09: 61 DEGREES
CALCULATED R AXIS, ECG10: 47 DEGREES
CALCULATED T AXIS, ECG11: 44 DEGREES
DIAGNOSIS, 93000: NORMAL
P-R INTERVAL, ECG05: 142 MS
Q-T INTERVAL, ECG07: 362 MS
QRS DURATION, ECG06: 80 MS
QTC CALCULATION (BEZET), ECG08: 430 MS
VENTRICULAR RATE, ECG03: 85 BPM

## 2022-09-22 ENCOUNTER — HOSPITAL ENCOUNTER (OUTPATIENT)
Dept: GENERAL RADIOLOGY | Age: 42
Discharge: HOME | End: 2022-09-22
Attending: COLON & RECTAL SURGERY
Payer: COMMERCIAL

## 2022-09-22 DIAGNOSIS — C20 RECTAL CANCER (HCC): ICD-10-CM

## 2022-09-22 PROCEDURE — 74011000636 HC RX REV CODE- 636: Performed by: COLON & RECTAL SURGERY

## 2022-09-22 PROCEDURE — 74270 X-RAY XM COLON 1CNTRST STD: CPT

## 2022-09-22 RX ADMIN — DIATRIZOATE MEGLUMINE AND DIATRIZOATE SODIUM 960 ML: 660; 100 LIQUID ORAL; RECTAL at 09:25

## 2022-10-04 ENCOUNTER — HOSPITAL ENCOUNTER (OUTPATIENT)
Dept: LAB | Age: 42
Discharge: HOME | End: 2022-10-04
Payer: COMMERCIAL

## 2022-10-04 DIAGNOSIS — C20 RECTAL CANCER (HCC): ICD-10-CM

## 2022-10-04 DIAGNOSIS — Z43.2 ATTENTION TO ILEOSTOMY (HCC): ICD-10-CM

## 2022-10-04 LAB
ANION GAP SERPL CALC-SCNC: 7 MMOL/L (ref 3–18)
APTT PPP: 29.7 SEC (ref 23–36.4)
BASOPHILS # BLD: 0.1 K/UL (ref 0–0.1)
BASOPHILS NFR BLD: 1 % (ref 0–2)
BUN SERPL-MCNC: 9 MG/DL (ref 7–18)
BUN/CREAT SERPL: 11 (ref 12–20)
CALCIUM SERPL-MCNC: 8.9 MG/DL (ref 8.5–10.1)
CHLORIDE SERPL-SCNC: 109 MMOL/L (ref 100–111)
CO2 SERPL-SCNC: 25 MMOL/L (ref 21–32)
CREAT SERPL-MCNC: 0.82 MG/DL (ref 0.6–1.3)
DIFFERENTIAL METHOD BLD: ABNORMAL
EOSINOPHIL # BLD: 0.2 K/UL (ref 0–0.4)
EOSINOPHIL NFR BLD: 5 % (ref 0–5)
ERYTHROCYTE [DISTWIDTH] IN BLOOD BY AUTOMATED COUNT: 15.6 % (ref 11.6–14.5)
GLUCOSE SERPL-MCNC: 68 MG/DL (ref 74–99)
HCT VFR BLD AUTO: 45.5 % (ref 36–48)
HGB BLD-MCNC: 14.9 G/DL (ref 13–16)
IMM GRANULOCYTES # BLD AUTO: 0 K/UL (ref 0–0.04)
IMM GRANULOCYTES NFR BLD AUTO: 0 % (ref 0–0.5)
INR PPP: 0.9 (ref 0.8–1.2)
LYMPHOCYTES # BLD: 1.4 K/UL (ref 0.9–3.6)
LYMPHOCYTES NFR BLD: 32 % (ref 21–52)
MCH RBC QN AUTO: 29.6 PG (ref 24–34)
MCHC RBC AUTO-ENTMCNC: 32.7 G/DL (ref 31–37)
MCV RBC AUTO: 90.3 FL (ref 78–100)
MONOCYTES # BLD: 0.8 K/UL (ref 0.05–1.2)
MONOCYTES NFR BLD: 17 % (ref 3–10)
NEUTS SEG # BLD: 1.9 K/UL (ref 1.8–8)
NEUTS SEG NFR BLD: 44 % (ref 40–73)
NRBC # BLD: 0 K/UL (ref 0–0.01)
NRBC BLD-RTO: 0 PER 100 WBC
PLATELET # BLD AUTO: 233 K/UL (ref 135–420)
PMV BLD AUTO: 10.4 FL (ref 9.2–11.8)
POTASSIUM SERPL-SCNC: 4.1 MMOL/L (ref 3.5–5.5)
PROTHROMBIN TIME: 12.8 SEC (ref 11.5–15.2)
RBC # BLD AUTO: 5.04 M/UL (ref 4.35–5.65)
SODIUM SERPL-SCNC: 141 MMOL/L (ref 136–145)
WBC # BLD AUTO: 4.4 K/UL (ref 4.6–13.2)

## 2022-10-04 PROCEDURE — 36415 COLL VENOUS BLD VENIPUNCTURE: CPT

## 2022-10-04 PROCEDURE — 80048 BASIC METABOLIC PNL TOTAL CA: CPT

## 2022-10-04 PROCEDURE — 85025 COMPLETE CBC W/AUTO DIFF WBC: CPT

## 2022-10-04 PROCEDURE — 85610 PROTHROMBIN TIME: CPT

## 2022-10-04 PROCEDURE — 85730 THROMBOPLASTIN TIME PARTIAL: CPT

## 2022-10-12 ENCOUNTER — HOSPITAL ENCOUNTER (OUTPATIENT)
Age: 42
Setting detail: OUTPATIENT SURGERY
Discharge: HOME | End: 2022-10-12
Attending: COLON & RECTAL SURGERY | Admitting: COLON & RECTAL SURGERY
Payer: COMMERCIAL

## 2022-10-12 VITALS
BODY MASS INDEX: 31.86 KG/M2 | HEIGHT: 67 IN | OXYGEN SATURATION: 98 % | DIASTOLIC BLOOD PRESSURE: 82 MMHG | WEIGHT: 203 LBS | SYSTOLIC BLOOD PRESSURE: 128 MMHG | TEMPERATURE: 97.6 F | HEART RATE: 85 BPM | RESPIRATION RATE: 16 BRPM

## 2022-10-12 LAB — GLUCOSE BLD STRIP.AUTO-MCNC: 104 MG/DL (ref 70–110)

## 2022-10-12 PROCEDURE — 77030008565 HC TBNG SUC IRR ERBE -B: Performed by: COLON & RECTAL SURGERY

## 2022-10-12 PROCEDURE — 82962 GLUCOSE BLOOD TEST: CPT

## 2022-10-12 PROCEDURE — 45330 DIAGNOSTIC SIGMOIDOSCOPY: CPT | Performed by: COLON & RECTAL SURGERY

## 2022-10-12 PROCEDURE — 76040000019: Performed by: COLON & RECTAL SURGERY

## 2022-10-12 PROCEDURE — 2709999900 HC NON-CHARGEABLE SUPPLY: Performed by: COLON & RECTAL SURGERY

## 2022-10-12 NOTE — DISCHARGE INSTRUCTIONS
Follow up for surgery as planned    Findings/Interventions:   Polyps - none     Patent, healed colorectal anastomosis. Recommendations: Loop ileostomy reversal.   Resume normal medication(s). [facility] sigmoidoscopy lets your doctor look inside the lower part of your large intestine. This is also called the colon. The doctor uses a lighted tube called a sigmoidoscope (or scope). This test let the doctor look for small growths (called polyps), cancer, bleeding, hemorrhoids, or other problems. The doctor also may have used the scope to remove polyps. Or he or she may have used it to take tissue samples that need to be tested. You shouldn't have any pain after the procedure. But it is normal to pass gas. You may have mild discomfort from having gas. If your doctor removed polyps, you will likely need to schedule a colonoscopy to look at the whole colon. This care sheet gives you a general idea about how long it will take for you to recover. But each person recovers at a different pace. Follow the steps below to get better as quickly as possible. How can you care for yourself at home? Activity    Most people are able to return to work right away unless they have had a sedative during the procedure. You may need someone to drive you home if you have had a sedative. In most cases, you can drive yourself home. Diet    You can eat your normal diet. If your stomach is upset, try bland, low-fat foods like plain rice, broiled chicken, toast, and yogurt. Be sure to drink plenty of liquids to replace those you have lost during the preparation for the procedure. Exercise    You can return to normal exercise right away. Medicine    Your doctor will tell you if and when you can restart your medicines. He or she will also give you instructions about taking any new medicines.      If you take aspirin or some other blood thinner, ask your doctor if and when to start taking it again. Make sure that you understand exactly what your doctor wants you to do. Follow-up care is a key part of your treatment and safety. Be sure to make and go to all appointments, and call your doctor if you are having problems. It's also a good idea to know your test results and keep a list of the medicines you take. When should you call for help? Call your doctor now or seek immediate medical care if:    You have new or worse belly pain. You have blood in your stools. Watch closely for changes in your health, and be sure to contact your doctor if you have any problems. Where can you learn more? Go to http://www.gray.com/  Enter [id number] in the search box to learn more about \"Sigmoidoscopy: What to Expect at Home. \"  Current as of: September 8, 2021               Content Version: 13.2  © 2006-2022 [employer]. Care instructions adapted under license by [employer] (which disclaims liability or warranty for this information). If you have questions about a medical condition or this instruction, always ask your healthcare professional. [place] [address] any warranty or liability for your use of this information. DISCHARGE SUMMARY from Nurse    PATIENT INSTRUCTIONS:    After general anesthesia or intravenous sedation, for 24 hours or while taking prescription Narcotics:  Limit your activities  Do not drive and operate hazardous machinery  Do not make important personal or business decisions  Do  not drink alcoholic beverages  If you have not urinated within 8 hours after discharge, please contact your surgeon on call.     Report the following to your surgeon:  Excessive pain, swelling, redness or odor of or around the surgical area  Temperature over 100.5  Nausea and vomiting lasting longer than 4 hours or if unable to take medications  Any signs of decreased circulation or nerve impairment to extremity: change in color, persistent  numbness, tingling, coldness or increase pain  Any questions        These are general instructions for a healthy lifestyle:    No smoking/ No tobacco products/ Avoid exposure to second hand smoke  Surgeon General's Warning:  Quitting smoking now greatly reduces serious risk to your health. Obesity, smoking, and sedentary lifestyle greatly increases your risk for illness    A healthy diet, regular physical exercise & weight monitoring are important for maintaining a healthy lifestyle    You may be retaining fluid if you have a history of heart failure or if you experience any of the following symptoms:  Weight gain of 3 pounds or more overnight or 5 pounds in a week, increased swelling in our hands or feet or shortness of breath while lying flat in bed. Please call your doctor as soon as you notice any of these symptoms; do not wait until your next office visit. The discharge information has been reviewed with the patient. The patient verbalized understanding. Discharge medications reviewed with the patient and appropriate educational materials and side effects teaching were provided.   ___________________________________________________________________________________________________________________________________

## 2022-10-12 NOTE — H&P
HPI: Nick Monroy is a 35 y.o. male presenting with chief complain of rectal cancer    Past Medical History:   Diagnosis Date    Brain aneurysm     at 10years of age    COVID-23 01/13/2022    asymptomatic    Diabetes ([facility]     Hypertension     Iron deficiency anemia     Rectal cancer ([facility]     Seizure ([address]     post brain surgery while in ICU ONCE! TREATED WITH DILANTIN age 10    Seizures ([address]     age 10       Past Surgical History:   Procedure Laterality Date    FLEXIBLE SIGMOIDOSCOPY N/A 11/24/2021    SIGMOIDOSCOPY FLEXIBLE performed by Loida Sandra MD at [place] [address] N/A 2/11/2022    FLEXIBLE SIGMOIDOSCOPY performed by Loida Sandra MD at [facility] MAIN OR    HX COLONOSCOPY      HX GI  02/2022    Robotic LAR    HX INTRACRANIAL ANEURYSM REPAIR      age 10       History reviewed. No pertinent family history. Social History     Socioeconomic History    Marital status:    Tobacco Use    Smoking status: Never    Smokeless tobacco: Never   Vaping Use    Vaping Use: Never used   Substance and Sexual Activity    Alcohol use: Yes     Comment: socially    Drug use: Yes     Types: Marijuana     Comment: marijuana 1/27/22       Review of Systems - neg    Outpatient Medications Marked as Taking for the 10/12/22 encounter [facility] Encounter)   Medication Sig Dispense Refill    benazepriL (LOTENSIN) 20 mg tablet 20 mg daily. amLODIPine (NORVASC) 5 mg tablet 5 mg daily. glimepiride (AMARYL) 2 mg tablet Take 2 mg by mouth every morning. No Known Allergies    Vitals:    10/12/22 1216   BP: 132/77   Pulse: 86   Resp: 18   Temp: 97.9 °F (36.6 °C)   SpO2: 98%   Weight: 92.1 kg (203 lb)   Height: 5' 7\" (1.702 m)       Physical Exam  Constitutional:       Appearance: He is well-developed. HENT:      Head: Normocephalic and atraumatic. Eyes:      Conjunctiva/sclera: Conjunctivae normal.   Abdominal:      General: There is no distension. Palpations: Abdomen is soft. There is no mass. Tenderness: There is no abdominal tenderness. There is no guarding. Musculoskeletal:         General: Normal range of motion. Lymphadenopathy:      Cervical: No cervical adenopathy. Skin:     General: Skin is warm and dry. Neurological:      Sensory: No sensory deficit. Psychiatric:         Speech: Speech normal.       Assessment / Plan    Flex sig    The diagnoses and plan were discussed with the patient. All questions answered. Plan of care agreed to by all concerned.

## 2022-10-12 NOTE — PROGRESS NOTES
[address] October 12, 2022       RE: Conner Osman      To Whom It May Concern,    This is to certify that Conner Osman may may return to work on 10/13/2022. Please feel free to contact my office at [phone] if you have any questions or concerns. Thank you for your assistance in this matter.       Sincerely,  Nena Bowden RN

## 2022-10-12 NOTE — PROCEDURES
[facility]  [address], [address] [facility],[address]   [place], [address]  [phone]                    Sigmoidoscopy Procedure Note      Donna Gonzales  1980  [id number]                Date of Procedure: 10/12/2022    Preoperative diagnosis: Rectal cancer    Postoperative diagnosis: Same    :  Eliz Maza MD    Assistant(s): Endoscopy Technician-1: Timothy Mcgraw  Endoscopy RN-1: Ines Young    Sedation: None    Procedure Details:  Prior to the procedure, a history and physical were performed. The patients medications, allergies and sensitivities were reviewed and all questions were answered. After informed consent was obtained for the procedure, with all risks and benefits of procedure explained. The patient was taken to the endoscopy suite and placed in the left lateral decubitus position. Patient identification and proposed procedure were verified prior to the procedure by the nurse and I. A digital rectal exam was performed and was normal.  The Olympus video colonoscope was introduced through the anus and advanced to descending colon. The quality of preparation was good. The colonoscope was slowly withdrawn and the mucosa examined for any abnormalities. The patient tolerated the procedure well. There were no complications. Findings/Interventions:   Polyps - none    Patent, healed colorectal anastomosis. EBL: none    Recommendations: Loop ileostomy reversal.   Resume normal medication(s).      Discharge Disposition:  Kanika Thurman MD  10/12/2022  1:41 PM

## 2022-10-14 DIAGNOSIS — C20 RECTAL CANCER (HCC): ICD-10-CM

## 2022-10-14 DIAGNOSIS — Z01.818 PRE-OP TESTING: Primary | ICD-10-CM

## 2022-10-14 RX ORDER — DAPAGLIFLOZIN AND METFORMIN HYDROCHLORIDE 10; 1000 MG/1; MG/1
TABLET, FILM COATED, EXTENDED RELEASE ORAL
COMMUNITY

## 2022-10-14 NOTE — PERIOP NOTES
Richie Duane  PAT phone assessment completed on 10/14/2022. The following instructions were reviewed with Richie Duane and he verbalized understanding. Do NOT eat or drink anything, including candy, gum, or ice chips after midnight on 10/20/2022, unless you have specific instructions from your surgeon or anesthesia provider to do so. You may brush your teeth before coming to the hospital.  No smoking 24 hours prior to the day of surgery. No alcohol 24 hours prior to the day of surgery. No recreational drugs for one week prior to the day of surgery. Leave all valuables, including money/purse, at home. Remove all jewelry, nail polish, acrylic nails, and makeup (including mascara); no lotions powders, deodorant, or perfume/cologne/after shave on the skin. Glasses/contact lenses and dentures may be worn to the hospital.  They will be removed prior to surgery. Call your doctor if symptoms of a cold or illness develop within 24-48 hours prior to your surgery. 10.  AN ADULT MUST DRIVE YOU HOME AFTER OUTPATIENT SURGERY. 11.  If you are having an outpatient procedure, please make arrangements for a responsible adult to be with you for 24 hours after your surgery. 12.  NO VISITORS in the hospital at this time for outpatient procedures. Exceptions may be made for surgical admissions, per hospital guidelines        Special Instructions:      Bring list of CURRENT medications. Bring any pertinent legal medical records. Take these medications the morning of surgery with a sip of water:  Blood Pressure medications as directed by physician  Follow physician instructions about blood sugar medications as directed  Follow physician instructions about stopping anticoagulants. Complete bowel prep per MD instructions.

## 2022-10-17 ENCOUNTER — HOSPITAL ENCOUNTER (OUTPATIENT)
Dept: PREADMISSION TESTING | Age: 42
Discharge: HOME | End: 2022-10-17
Payer: COMMERCIAL

## 2022-10-17 DIAGNOSIS — C20 RECTAL CANCER (HCC): ICD-10-CM

## 2022-10-17 DIAGNOSIS — Z01.818 PRE-OP TESTING: ICD-10-CM

## 2022-10-17 LAB
ABO + RH BLD: NORMAL
BLOOD GROUP ANTIBODIES SERPL: NORMAL
SPECIMEN EXP DATE BLD: NORMAL

## 2022-10-17 PROCEDURE — 36415 COLL VENOUS BLD VENIPUNCTURE: CPT

## 2022-10-17 PROCEDURE — 86900 BLOOD TYPING SEROLOGIC ABO: CPT

## 2022-10-20 ENCOUNTER — ANESTHESIA EVENT (OUTPATIENT)
Dept: SURGERY | Age: 42
DRG: 330 | End: 2022-10-20
Payer: COMMERCIAL

## 2022-10-21 ENCOUNTER — HOSPITAL ENCOUNTER (INPATIENT)
Age: 42
LOS: 3 days | Discharge: HOME HEALTH | DRG: 331 | End: 2022-10-24
Attending: COLON & RECTAL SURGERY | Admitting: COLON & RECTAL SURGERY
Payer: COMMERCIAL

## 2022-10-21 ENCOUNTER — ANESTHESIA (OUTPATIENT)
Dept: SURGERY | Age: 42
DRG: 330 | End: 2022-10-21
Payer: COMMERCIAL

## 2022-10-21 DIAGNOSIS — C20 RECTAL CANCER (HCC): Primary | ICD-10-CM

## 2022-10-21 LAB
AMPHET UR QL SCN: NEGATIVE
BARBITURATES UR QL SCN: NEGATIVE
BENZODIAZ UR QL: NEGATIVE
CANNABINOIDS UR QL SCN: NEGATIVE
COCAINE UR QL SCN: NEGATIVE
GLUCOSE BLD STRIP.AUTO-MCNC: 110 MG/DL (ref 70–110)
GLUCOSE BLD STRIP.AUTO-MCNC: 138 MG/DL (ref 70–110)
GLUCOSE BLD STRIP.AUTO-MCNC: 188 MG/DL (ref 70–110)
HBA1C MFR BLD: 7.8 % (ref 4.2–5.6)
HDSCOM,HDSCOM: NORMAL
METHADONE UR QL: NEGATIVE
OPIATES UR QL: NEGATIVE
PCP UR QL: NEGATIVE

## 2022-10-21 PROCEDURE — 77030031139 HC SUT VCRL2 J&J -A: Performed by: COLON & RECTAL SURGERY

## 2022-10-21 PROCEDURE — 74011250636 HC RX REV CODE- 250/636: Performed by: NURSE ANESTHETIST, CERTIFIED REGISTERED

## 2022-10-21 PROCEDURE — 74011000250 HC RX REV CODE- 250: Performed by: ANESTHESIOLOGY

## 2022-10-21 PROCEDURE — 76010000132 HC OR TIME 2.5 TO 3 HR: Performed by: COLON & RECTAL SURGERY

## 2022-10-21 PROCEDURE — 77030003028 HC SUT VCRL J&J -A: Performed by: COLON & RECTAL SURGERY

## 2022-10-21 PROCEDURE — 83036 HEMOGLOBIN GLYCOSYLATED A1C: CPT

## 2022-10-21 PROCEDURE — 0DBB0ZZ EXCISION OF ILEUM, OPEN APPROACH: ICD-10-PCS | Performed by: COLON & RECTAL SURGERY

## 2022-10-21 PROCEDURE — 74011000250 HC RX REV CODE- 250: Performed by: COLON & RECTAL SURGERY

## 2022-10-21 PROCEDURE — 77030009978 HC RELD STPLR TCR J&J -B: Performed by: COLON & RECTAL SURGERY

## 2022-10-21 PROCEDURE — 36590 REMOVAL TUNNELED CV CATH: CPT | Performed by: COLON & RECTAL SURGERY

## 2022-10-21 PROCEDURE — 77030002986 HC SUT PROL J&J -A: Performed by: COLON & RECTAL SURGERY

## 2022-10-21 PROCEDURE — 44625 REPAIR BOWEL OPENING: CPT | Performed by: COLON & RECTAL SURGERY

## 2022-10-21 PROCEDURE — 74011250636 HC RX REV CODE- 250/636: Performed by: COLON & RECTAL SURGERY

## 2022-10-21 PROCEDURE — 77030002966 HC SUT PDS J&J -A: Performed by: COLON & RECTAL SURGERY

## 2022-10-21 PROCEDURE — 82962 GLUCOSE BLOOD TEST: CPT

## 2022-10-21 PROCEDURE — 0JPT0WZ REMOVAL OF TOTALLY IMPLANTABLE VASCULAR ACCESS DEVICE FROM TRUNK SUBCUTANEOUS TISSUE AND FASCIA, OPEN APPROACH: ICD-10-PCS | Performed by: COLON & RECTAL SURGERY

## 2022-10-21 PROCEDURE — 74011000250 HC RX REV CODE- 250: Performed by: NURSE ANESTHETIST, CERTIFIED REGISTERED

## 2022-10-21 PROCEDURE — 74011250636 HC RX REV CODE- 250/636: Performed by: ANESTHESIOLOGY

## 2022-10-21 PROCEDURE — 00790 ANES IPER UPR ABD NOS: CPT | Performed by: ANESTHESIOLOGY

## 2022-10-21 PROCEDURE — 74011636637 HC RX REV CODE- 636/637: Performed by: NURSE ANESTHETIST, CERTIFIED REGISTERED

## 2022-10-21 PROCEDURE — 77030026438 HC STYL ET INTUB CARD -A: Performed by: ANESTHESIOLOGY

## 2022-10-21 PROCEDURE — 77030040830 HC CATH URETH FOL MDII -A: Performed by: COLON & RECTAL SURGERY

## 2022-10-21 PROCEDURE — 88300 SURGICAL PATH GROSS: CPT

## 2022-10-21 PROCEDURE — 2709999900 HC NON-CHARGEABLE SUPPLY

## 2022-10-21 PROCEDURE — 88304 TISSUE EXAM BY PATHOLOGIST: CPT

## 2022-10-21 PROCEDURE — 2709999900 HC NON-CHARGEABLE SUPPLY: Performed by: COLON & RECTAL SURGERY

## 2022-10-21 PROCEDURE — 76060000036 HC ANESTHESIA 2.5 TO 3 HR: Performed by: COLON & RECTAL SURGERY

## 2022-10-21 PROCEDURE — 74011250637 HC RX REV CODE- 250/637: Performed by: COLON & RECTAL SURGERY

## 2022-10-21 PROCEDURE — 80307 DRUG TEST PRSMV CHEM ANLYZR: CPT

## 2022-10-21 PROCEDURE — 77030036731 HC STPLR ENDOSC J&J -F: Performed by: COLON & RECTAL SURGERY

## 2022-10-21 PROCEDURE — 76210000006 HC OR PH I REC 0.5 TO 1 HR: Performed by: COLON & RECTAL SURGERY

## 2022-10-21 PROCEDURE — 77030018836 HC SOL IRR NACL ICUM -A: Performed by: COLON & RECTAL SURGERY

## 2022-10-21 PROCEDURE — 77030008683 HC TU ET CUF COVD -A: Performed by: ANESTHESIOLOGY

## 2022-10-21 PROCEDURE — 77030011808 HC STPLR ENDOSCOPIC J&J -D: Performed by: COLON & RECTAL SURGERY

## 2022-10-21 PROCEDURE — 77030027138 HC INCENT SPIROMETER -A

## 2022-10-21 PROCEDURE — 65660000004 HC RM CVT STEPDOWN

## 2022-10-21 RX ORDER — BUPIVACAINE HYDROCHLORIDE AND EPINEPHRINE 5; 5 MG/ML; UG/ML
INJECTION, SOLUTION EPIDURAL; INTRACAUDAL; PERINEURAL AS NEEDED
Status: DISCONTINUED | OUTPATIENT
Start: 2022-10-21 | End: 2022-10-21 | Stop reason: HOSPADM

## 2022-10-21 RX ORDER — MIDAZOLAM HYDROCHLORIDE 1 MG/ML
INJECTION, SOLUTION INTRAMUSCULAR; INTRAVENOUS AS NEEDED
Status: DISCONTINUED | OUTPATIENT
Start: 2022-10-21 | End: 2022-10-21 | Stop reason: HOSPADM

## 2022-10-21 RX ORDER — SODIUM CHLORIDE, SODIUM LACTATE, POTASSIUM CHLORIDE, CALCIUM CHLORIDE 600; 310; 30; 20 MG/100ML; MG/100ML; MG/100ML; MG/100ML
125 INJECTION, SOLUTION INTRAVENOUS CONTINUOUS
Status: DISCONTINUED | OUTPATIENT
Start: 2022-10-21 | End: 2022-10-24

## 2022-10-21 RX ORDER — HYDROMORPHONE HYDROCHLORIDE 1 MG/ML
INJECTION, SOLUTION INTRAMUSCULAR; INTRAVENOUS; SUBCUTANEOUS AS NEEDED
Status: DISCONTINUED | OUTPATIENT
Start: 2022-10-21 | End: 2022-10-21 | Stop reason: HOSPADM

## 2022-10-21 RX ORDER — NEOSTIGMINE METHYLSULFATE 1 MG/ML
INJECTION, SOLUTION INTRAVENOUS AS NEEDED
Status: DISCONTINUED | OUTPATIENT
Start: 2022-10-21 | End: 2022-10-21 | Stop reason: HOSPADM

## 2022-10-21 RX ORDER — INSULIN LISPRO 100 [IU]/ML
INJECTION, SOLUTION INTRAVENOUS; SUBCUTANEOUS ONCE
Status: DISCONTINUED | OUTPATIENT
Start: 2022-10-21 | End: 2022-10-21 | Stop reason: HOSPADM

## 2022-10-21 RX ORDER — SODIUM CHLORIDE, SODIUM LACTATE, POTASSIUM CHLORIDE, CALCIUM CHLORIDE 600; 310; 30; 20 MG/100ML; MG/100ML; MG/100ML; MG/100ML
75 INJECTION, SOLUTION INTRAVENOUS CONTINUOUS
Status: DISCONTINUED | OUTPATIENT
Start: 2022-10-21 | End: 2022-10-21 | Stop reason: HOSPADM

## 2022-10-21 RX ORDER — ONDANSETRON 2 MG/ML
4 INJECTION INTRAMUSCULAR; INTRAVENOUS ONCE
Status: DISCONTINUED | OUTPATIENT
Start: 2022-10-21 | End: 2022-10-21 | Stop reason: HOSPADM

## 2022-10-21 RX ORDER — MORPHINE SULFATE 2 MG/ML
2 INJECTION, SOLUTION INTRAMUSCULAR; INTRAVENOUS
Status: DISCONTINUED | OUTPATIENT
Start: 2022-10-21 | End: 2022-10-24 | Stop reason: HOSPADM

## 2022-10-21 RX ORDER — MAGNESIUM SULFATE 100 %
4 CRYSTALS MISCELLANEOUS AS NEEDED
Status: DISCONTINUED | OUTPATIENT
Start: 2022-10-21 | End: 2022-10-21 | Stop reason: HOSPADM

## 2022-10-21 RX ORDER — DEXMEDETOMIDINE HYDROCHLORIDE 4 UG/ML
INJECTION, SOLUTION INTRAVENOUS AS NEEDED
Status: DISCONTINUED | OUTPATIENT
Start: 2022-10-21 | End: 2022-10-21 | Stop reason: HOSPADM

## 2022-10-21 RX ORDER — DEXAMETHASONE SODIUM PHOSPHATE 4 MG/ML
INJECTION, SOLUTION INTRA-ARTICULAR; INTRALESIONAL; INTRAMUSCULAR; INTRAVENOUS; SOFT TISSUE AS NEEDED
Status: DISCONTINUED | OUTPATIENT
Start: 2022-10-21 | End: 2022-10-21 | Stop reason: HOSPADM

## 2022-10-21 RX ORDER — MAGNESIUM SULFATE 100 %
4 CRYSTALS MISCELLANEOUS AS NEEDED
Status: DISCONTINUED | OUTPATIENT
Start: 2022-10-21 | End: 2022-10-24 | Stop reason: HOSPADM

## 2022-10-21 RX ORDER — INSULIN LISPRO 100 [IU]/ML
INJECTION, SOLUTION INTRAVENOUS; SUBCUTANEOUS EVERY 6 HOURS
Status: DISCONTINUED | OUTPATIENT
Start: 2022-10-21 | End: 2022-10-23

## 2022-10-21 RX ORDER — ACETAMINOPHEN 500 MG
1000 TABLET ORAL ONCE
Status: COMPLETED | OUTPATIENT
Start: 2022-10-21 | End: 2022-10-21

## 2022-10-21 RX ORDER — HYDROMORPHONE HYDROCHLORIDE 2 MG/ML
0.5 INJECTION, SOLUTION INTRAMUSCULAR; INTRAVENOUS; SUBCUTANEOUS AS NEEDED
Status: DISCONTINUED | OUTPATIENT
Start: 2022-10-21 | End: 2022-10-21 | Stop reason: HOSPADM

## 2022-10-21 RX ORDER — DIPHENHYDRAMINE HYDROCHLORIDE 50 MG/ML
25 INJECTION, SOLUTION INTRAMUSCULAR; INTRAVENOUS
Status: DISCONTINUED | OUTPATIENT
Start: 2022-10-21 | End: 2022-10-24 | Stop reason: HOSPADM

## 2022-10-21 RX ORDER — FENTANYL CITRATE 50 UG/ML
INJECTION, SOLUTION INTRAMUSCULAR; INTRAVENOUS AS NEEDED
Status: DISCONTINUED | OUTPATIENT
Start: 2022-10-21 | End: 2022-10-21 | Stop reason: HOSPADM

## 2022-10-21 RX ORDER — GABAPENTIN 300 MG/1
600 CAPSULE ORAL ONCE
Status: COMPLETED | OUTPATIENT
Start: 2022-10-21 | End: 2022-10-21

## 2022-10-21 RX ORDER — SODIUM CHLORIDE, SODIUM LACTATE, POTASSIUM CHLORIDE, CALCIUM CHLORIDE 600; 310; 30; 20 MG/100ML; MG/100ML; MG/100ML; MG/100ML
50 INJECTION, SOLUTION INTRAVENOUS CONTINUOUS
Status: DISCONTINUED | OUTPATIENT
Start: 2022-10-21 | End: 2022-10-21 | Stop reason: HOSPADM

## 2022-10-21 RX ORDER — LABETALOL HYDROCHLORIDE 5 MG/ML
INJECTION, SOLUTION INTRAVENOUS AS NEEDED
Status: DISCONTINUED | OUTPATIENT
Start: 2022-10-21 | End: 2022-10-21 | Stop reason: HOSPADM

## 2022-10-21 RX ORDER — ONDANSETRON 2 MG/ML
INJECTION INTRAMUSCULAR; INTRAVENOUS AS NEEDED
Status: DISCONTINUED | OUTPATIENT
Start: 2022-10-21 | End: 2022-10-21 | Stop reason: HOSPADM

## 2022-10-21 RX ORDER — ACETAMINOPHEN 500 MG
1000 TABLET ORAL EVERY 6 HOURS
Status: DISCONTINUED | OUTPATIENT
Start: 2022-10-21 | End: 2022-10-24 | Stop reason: HOSPADM

## 2022-10-21 RX ORDER — ONDANSETRON 2 MG/ML
4 INJECTION INTRAMUSCULAR; INTRAVENOUS
Status: DISCONTINUED | OUTPATIENT
Start: 2022-10-21 | End: 2022-10-24 | Stop reason: HOSPADM

## 2022-10-21 RX ORDER — PHENYLEPHRINE HCL IN 0.9% NACL 1 MG/10 ML
SYRINGE (ML) INTRAVENOUS AS NEEDED
Status: DISCONTINUED | OUTPATIENT
Start: 2022-10-21 | End: 2022-10-21 | Stop reason: HOSPADM

## 2022-10-21 RX ORDER — GABAPENTIN 300 MG/1
300 CAPSULE ORAL 3 TIMES DAILY
Status: DISCONTINUED | OUTPATIENT
Start: 2022-10-21 | End: 2022-10-24 | Stop reason: HOSPADM

## 2022-10-21 RX ORDER — METRONIDAZOLE 500 MG/100ML
500 INJECTION, SOLUTION INTRAVENOUS
Status: COMPLETED | OUTPATIENT
Start: 2022-10-21 | End: 2022-10-21

## 2022-10-21 RX ORDER — SUCCINYLCHOLINE CHLORIDE 20 MG/ML
INJECTION INTRAMUSCULAR; INTRAVENOUS AS NEEDED
Status: DISCONTINUED | OUTPATIENT
Start: 2022-10-21 | End: 2022-10-21 | Stop reason: HOSPADM

## 2022-10-21 RX ORDER — PROPOFOL 10 MG/ML
INJECTION, EMULSION INTRAVENOUS AS NEEDED
Status: DISCONTINUED | OUTPATIENT
Start: 2022-10-21 | End: 2022-10-21 | Stop reason: HOSPADM

## 2022-10-21 RX ORDER — INSULIN LISPRO 100 [IU]/ML
INJECTION, SOLUTION INTRAVENOUS; SUBCUTANEOUS ONCE
Status: COMPLETED | OUTPATIENT
Start: 2022-10-21 | End: 2022-10-21

## 2022-10-21 RX ORDER — SODIUM CHLORIDE 0.9 % (FLUSH) 0.9 %
5-40 SYRINGE (ML) INJECTION AS NEEDED
Status: DISCONTINUED | OUTPATIENT
Start: 2022-10-21 | End: 2022-10-21 | Stop reason: HOSPADM

## 2022-10-21 RX ORDER — GLYCOPYRROLATE 0.2 MG/ML
INJECTION INTRAMUSCULAR; INTRAVENOUS AS NEEDED
Status: DISCONTINUED | OUTPATIENT
Start: 2022-10-21 | End: 2022-10-21 | Stop reason: HOSPADM

## 2022-10-21 RX ORDER — LIDOCAINE HYDROCHLORIDE 20 MG/ML
INJECTION, SOLUTION EPIDURAL; INFILTRATION; INTRACAUDAL; PERINEURAL AS NEEDED
Status: DISCONTINUED | OUTPATIENT
Start: 2022-10-21 | End: 2022-10-21 | Stop reason: HOSPADM

## 2022-10-21 RX ORDER — ROCURONIUM BROMIDE 10 MG/ML
INJECTION, SOLUTION INTRAVENOUS AS NEEDED
Status: DISCONTINUED | OUTPATIENT
Start: 2022-10-21 | End: 2022-10-21 | Stop reason: HOSPADM

## 2022-10-21 RX ORDER — AMLODIPINE BESYLATE 5 MG/1
5 TABLET ORAL DAILY
Status: DISCONTINUED | OUTPATIENT
Start: 2022-10-22 | End: 2022-10-24 | Stop reason: HOSPADM

## 2022-10-21 RX ORDER — HEPARIN SODIUM 5000 [USP'U]/ML
5000 INJECTION, SOLUTION INTRAVENOUS; SUBCUTANEOUS EVERY 8 HOURS
Status: DISCONTINUED | OUTPATIENT
Start: 2022-10-22 | End: 2022-10-24 | Stop reason: HOSPADM

## 2022-10-21 RX ORDER — SODIUM CHLORIDE 0.9 % (FLUSH) 0.9 %
5-40 SYRINGE (ML) INJECTION EVERY 8 HOURS
Status: DISCONTINUED | OUTPATIENT
Start: 2022-10-21 | End: 2022-10-21 | Stop reason: HOSPADM

## 2022-10-21 RX ORDER — DEXTROSE MONOHYDRATE 100 MG/ML
0-250 INJECTION, SOLUTION INTRAVENOUS AS NEEDED
Status: DISCONTINUED | OUTPATIENT
Start: 2022-10-21 | End: 2022-10-21 | Stop reason: HOSPADM

## 2022-10-21 RX ORDER — METRONIDAZOLE 500 MG/100ML
500 INJECTION, SOLUTION INTRAVENOUS EVERY 8 HOURS
Status: COMPLETED | OUTPATIENT
Start: 2022-10-21 | End: 2022-10-22

## 2022-10-21 RX ADMIN — HYDROMORPHONE HYDROCHLORIDE 0.6 MG: 1 INJECTION, SOLUTION INTRAMUSCULAR; INTRAVENOUS; SUBCUTANEOUS at 09:16

## 2022-10-21 RX ADMIN — ACETAMINOPHEN 1000 MG: 500 TABLET ORAL at 07:10

## 2022-10-21 RX ADMIN — FENTANYL CITRATE 50 MCG: 50 INJECTION, SOLUTION INTRAMUSCULAR; INTRAVENOUS at 07:38

## 2022-10-21 RX ADMIN — GABAPENTIN 600 MG: 300 CAPSULE ORAL at 07:11

## 2022-10-21 RX ADMIN — SODIUM CHLORIDE, SODIUM LACTATE, POTASSIUM CHLORIDE, AND CALCIUM CHLORIDE: 600; 310; 30; 20 INJECTION, SOLUTION INTRAVENOUS at 08:30

## 2022-10-21 RX ADMIN — GLYCOPYRROLATE 0.4 MG: 0.2 INJECTION INTRAMUSCULAR; INTRAVENOUS at 10:02

## 2022-10-21 RX ADMIN — Medication 4 MCG: at 09:14

## 2022-10-21 RX ADMIN — ROCURONIUM BROMIDE 5 MG: 50 INJECTION INTRAVENOUS at 07:38

## 2022-10-21 RX ADMIN — GABAPENTIN 300 MG: 300 CAPSULE ORAL at 21:59

## 2022-10-21 RX ADMIN — PROPOFOL 150 MG: 10 INJECTION, EMULSION INTRAVENOUS at 07:38

## 2022-10-21 RX ADMIN — WATER 2 G: 1 INJECTION INTRAMUSCULAR; INTRAVENOUS; SUBCUTANEOUS at 23:46

## 2022-10-21 RX ADMIN — MORPHINE SULFATE 2 MG: 2 INJECTION, SOLUTION INTRAMUSCULAR; INTRAVENOUS at 21:59

## 2022-10-21 RX ADMIN — HYDROMORPHONE HYDROCHLORIDE 0.4 MG: 1 INJECTION, SOLUTION INTRAMUSCULAR; INTRAVENOUS; SUBCUTANEOUS at 08:36

## 2022-10-21 RX ADMIN — SODIUM CHLORIDE, POTASSIUM CHLORIDE, SODIUM LACTATE AND CALCIUM CHLORIDE 125 ML/HR: 600; 310; 30; 20 INJECTION, SOLUTION INTRAVENOUS at 13:26

## 2022-10-21 RX ADMIN — HYDROMORPHONE HYDROCHLORIDE 0.6 MG: 1 INJECTION, SOLUTION INTRAMUSCULAR; INTRAVENOUS; SUBCUTANEOUS at 07:54

## 2022-10-21 RX ADMIN — METRONIDAZOLE 500 MG: 500 INJECTION, SOLUTION INTRAVENOUS at 07:42

## 2022-10-21 RX ADMIN — LIDOCAINE HYDROCHLORIDE 60 MG: 20 INJECTION, SOLUTION EPIDURAL; INFILTRATION; INTRACAUDAL; PERINEURAL at 07:38

## 2022-10-21 RX ADMIN — MORPHINE SULFATE 2 MG: 2 INJECTION, SOLUTION INTRAMUSCULAR; INTRAVENOUS at 16:24

## 2022-10-21 RX ADMIN — FAMOTIDINE 20 MG: 10 INJECTION, SOLUTION INTRAVENOUS at 07:15

## 2022-10-21 RX ADMIN — FENTANYL CITRATE 25 MCG: 50 INJECTION, SOLUTION INTRAMUSCULAR; INTRAVENOUS at 07:53

## 2022-10-21 RX ADMIN — NEOSTIGMINE METHYLSULFATE 3 MG: 1 INJECTION, SOLUTION INTRAVENOUS at 10:02

## 2022-10-21 RX ADMIN — ROCURONIUM BROMIDE 20 MG: 50 INJECTION INTRAVENOUS at 08:19

## 2022-10-21 RX ADMIN — ROCURONIUM BROMIDE 25 MG: 50 INJECTION INTRAVENOUS at 07:42

## 2022-10-21 RX ADMIN — Medication 100 MCG: at 08:15

## 2022-10-21 RX ADMIN — ACETAMINOPHEN 1000 MG: 500 TABLET ORAL at 17:45

## 2022-10-21 RX ADMIN — ACETAMINOPHEN 1000 MG: 500 TABLET ORAL at 13:17

## 2022-10-21 RX ADMIN — CEFAZOLIN SODIUM 2 G: 1 INJECTION, POWDER, FOR SOLUTION INTRAMUSCULAR; INTRAVENOUS at 07:42

## 2022-10-21 RX ADMIN — GABAPENTIN 300 MG: 300 CAPSULE ORAL at 16:22

## 2022-10-21 RX ADMIN — DEXAMETHASONE SODIUM PHOSPHATE 4 MG: 4 INJECTION, SOLUTION INTRAMUSCULAR; INTRAVENOUS at 08:16

## 2022-10-21 RX ADMIN — FAMOTIDINE 20 MG: 10 INJECTION, SOLUTION INTRAVENOUS at 13:17

## 2022-10-21 RX ADMIN — Medication 4 MCG: at 09:07

## 2022-10-21 RX ADMIN — SUCCINYLCHOLINE CHLORIDE 100 MG: 20 INJECTION, SOLUTION INTRAMUSCULAR; INTRAVENOUS at 07:38

## 2022-10-21 RX ADMIN — HYDROMORPHONE HYDROCHLORIDE 0.4 MG: 1 INJECTION, SOLUTION INTRAMUSCULAR; INTRAVENOUS; SUBCUTANEOUS at 08:53

## 2022-10-21 RX ADMIN — METRONIDAZOLE 500 MG: 500 INJECTION, SOLUTION INTRAVENOUS at 23:46

## 2022-10-21 RX ADMIN — MIDAZOLAM HYDROCHLORIDE 2 MG: 2 INJECTION, SOLUTION INTRAMUSCULAR; INTRAVENOUS at 07:29

## 2022-10-21 RX ADMIN — ACETAMINOPHEN 1000 MG: 500 TABLET ORAL at 23:46

## 2022-10-21 RX ADMIN — FENTANYL CITRATE 25 MCG: 50 INJECTION, SOLUTION INTRAMUSCULAR; INTRAVENOUS at 07:47

## 2022-10-21 RX ADMIN — FAMOTIDINE 20 MG: 10 INJECTION, SOLUTION INTRAVENOUS at 21:59

## 2022-10-21 RX ADMIN — Medication 4 MCG: at 09:01

## 2022-10-21 RX ADMIN — Medication 3 UNITS: at 10:46

## 2022-10-21 RX ADMIN — SODIUM CHLORIDE, SODIUM LACTATE, POTASSIUM CHLORIDE, AND CALCIUM CHLORIDE 50 ML/HR: 600; 310; 30; 20 INJECTION, SOLUTION INTRAVENOUS at 07:15

## 2022-10-21 RX ADMIN — MORPHINE SULFATE 2 MG: 2 INJECTION, SOLUTION INTRAMUSCULAR; INTRAVENOUS at 13:17

## 2022-10-21 RX ADMIN — LABETALOL HYDROCHLORIDE 5 MG: 5 INJECTION, SOLUTION INTRAVENOUS at 09:28

## 2022-10-21 RX ADMIN — METRONIDAZOLE 500 MG: 500 INJECTION, SOLUTION INTRAVENOUS at 16:35

## 2022-10-21 RX ADMIN — WATER 2 G: 1 INJECTION INTRAMUSCULAR; INTRAVENOUS; SUBCUTANEOUS at 16:22

## 2022-10-21 RX ADMIN — ONDANSETRON 4 MG: 2 INJECTION INTRAMUSCULAR; INTRAVENOUS at 09:40

## 2022-10-21 NOTE — ANESTHESIA PREPROCEDURE EVALUATION
Relevant Problems   PERSONAL HX & FAMILY HX OF CANCER   (+) Rectal cancer (HCC)       Anesthetic History   No history of anesthetic complications            Review of Systems / Medical History  Patient summary reviewed and pertinent labs reviewed    Pulmonary                   Neuro/Psych     seizures         Cardiovascular    Hypertension                   GI/Hepatic/Renal                Endo/Other    Diabetes: type 2    Cancer     Other Findings              Physical Exam    Airway  Mallampati: II  TM Distance: 4 - 6 cm  Neck ROM: normal range of motion   Mouth opening: Normal     Cardiovascular    Rhythm: regular  Rate: normal         Dental    Dentition: Poor dentition     Pulmonary  Breath sounds clear to auscultation               Abdominal  GI exam deferred       Other Findings            Anesthetic Plan    ASA: 2  Anesthesia type: general          Induction: Intravenous  Anesthetic plan and risks discussed with: Patient Detail Level: Zone Otc Regimen: Wash with Neutrogena Clear Pore BP wash in the morning.\\nContinue Pink Grapefruit with Salicylic Acid at night

## 2022-10-21 NOTE — ACP (ADVANCE CARE PLANNING)
Advance Care Planning   Advance Care Planning Inpatient Note  [address] [place] [address]   Spiritual Care Department    Today's Date: 10/21/2022  Unit: [facility]    Received request from . Upon review of chart and communication with care team, patient's decision making abilities are not in question. Patient was/were present in the room during visit. Goals of ACP Conversation:  Discuss Advance Care planning documents    Health Care Decision Makers:      Primary Decision Maker: Adolfo Dwyer () - [facility] - [phone]    Summary:  No Decision Maker named by patient at this time    Advance Care Planning Documents (Patient Wishes) on file:  None     Assessment:    Patient seen as a pre-op visit this morning and will be staying here after his procedure.   There is no advance directive present  Interventions:  Deferred conversation as patient not interested in completing an advance directive at this time    Care Preferences Communicated:  No    Outcomes/Plan:      [facility] on 10/21/2022 at 8:59 AM

## 2022-10-21 NOTE — ANESTHESIA POSTPROCEDURE EVALUATION
Procedure(s):  LOOP ILEOSTOMY REVERSAL  Mediport Removal.    general    Anesthesia Post Evaluation      Multimodal analgesia: multimodal analgesia used between 6 hours prior to anesthesia start to PACU discharge  Patient location during evaluation: bedside  Patient participation: complete - patient participated  Level of consciousness: awake  Pain management: adequate  Airway patency: patent  Anesthetic complications: no  Cardiovascular status: stable  Respiratory status: acceptable  Hydration status: acceptable  Post anesthesia nausea and vomiting:  controlled      INITIAL Post-op Vital signs:   Vitals Value Taken Time   /82 10/21/22 1055   Temp 36.6 °C (97.8 °F) 10/21/22 1055   Pulse 80 10/21/22 1056   Resp 15 10/21/22 1056   SpO2 96 % 10/21/22 1056   Vitals shown include unvalidated device data.

## 2022-10-21 NOTE — PROGRESS NOTES
TRANSFER - IN REPORT:    Verbal report received from Orin KUMAR(name) on Eula Treadwell  being received from surgery unit (unit) for routine post - op      Report consisted of patients Situation, Background, Assessment and   Recommendations(SBAR). Information from the following report(s) SBAR, Recent Results, and Procedure Verification was reviewed with the receiving nurse. Opportunity for questions and clarification was provided. Assessment completed upon patients arrival to unit and care assumed.

## 2022-10-21 NOTE — PROGRESS NOTES
conducted a pre-surgery visit with Jona Sepulveda, who is a 35 y.o.,male. The  provided the following Interventions:  Initiated a relationship of care and support. Offered prayer and assurance of continued prayers on patient's behalf. There is no advance directive present. Plan:  Chaplains will continue to follow and will provide pastoral care on an as needed/requested basis.  recommends bedside caregivers page  on duty if patient shows signs of acute spiritual or emotional distress.    Reiseñor 3   Board Certified [address] [place] [address]   [phone]

## 2022-10-21 NOTE — BRIEF OP NOTE
Brief Postoperative Note    Patient: Heriberto Back  YOB: 1980  MRN: [id number]    Date of Procedure: 10/21/2022     Pre-Op Diagnosis: Rectal cancer ([facility] [C20]  Attention to ileostomy ([facility] [Z43.2]    Post-Op Diagnosis: Same as preoperative diagnosis. Procedure(s):  LOOP ILEOSTOMY REVERSAL  Mediport Removal    Surgeon(s):  Delano Pinedo MD    Surgical Assistant: Surg Asst-1: Hammad Mcnamara    Anesthesia: General     Estimated Blood Loss (mL): less than 50     Complications: None    Specimens:   ID Type Source Tests Collected by Time Destination   1 : right chest mediport Other   Delano Pinedo MD 10/21/2022 0825 Pathology   2 : right chest mediport capsule Preservative   Delano Pinedo MD 10/21/2022 8699 Pathology   3 : ileostomy Preservative   Delano Pinedo MD 10/21/2022 9664 Pathology        Implants:   Implant Name Type Inv.  Item Serial No.  Lot No. LRB No. Used Action   KIT INTERMED XPORT PWR 8FR --  - [id number]  PORT INFUS 8FR PLAS ATTCH OPN END POLYUR CATH BELTRAN FILL SUT  BARD PERIPHERAL VASCULAR_WD [id number] Right 1 Explanted       Drains: * No LDAs found *    Findings: stoma    [id number]    Electronically Signed by Sage Delong MD on 10/21/2022 at 10:07 AM

## 2022-10-21 NOTE — PERIOP NOTES
TRANSFER - OUT REPORT:    Verbal report given to DUNIA Medeiros RN(name) on Yael Kearns  being transferred to CVT SD(unit) for routine post - op       Report consisted of patients Situation, Background, Assessment and   Recommendations(SBAR). Information from the following report(s) SBAR, Kardex, OR Summary, Procedure Summary, Intake/Output, and MAR was reviewed with the receiving nurse. Lines:   Peripheral IV 10/21/22 Anterior; Left Forearm (Active)   Site Assessment Clean, dry, & intact 10/21/22 1025   Phlebitis Assessment 0 10/21/22 1025   Infiltration Assessment 0 10/21/22 1025   Dressing Status Clean, dry, & intact 10/21/22 1025   Dressing Type Tape;Transparent 10/21/22 1025   Hub Color/Line Status Pink; Infusing 10/21/22 1025   Alcohol Cap Used No 10/21/22 0704        Opportunity for questions and clarification was provided.       Patient transported with:   O2 @ 3 liters  Tech

## 2022-10-21 NOTE — H&P
HPI: Jona Sepulveda is a 35 y.o. male presenting with chief complain of rectal cancer. Past Medical History:   Diagnosis Date    Brain aneurysm     at 10years of age    COVID-23 01/13/2022    asymptomatic    Diabetes ([address]     Hypertension     Iron deficiency anemia     Rectal cancer ([address]     Seizure ([address]     post brain surgery while in ICU ONCE! TREATED WITH DILANTIN age 10    Seizures ([address]     age 10       Past Surgical History:   Procedure Laterality Date    FLEXIBLE SIGMOIDOSCOPY N/A 11/24/2021    SIGMOIDOSCOPY FLEXIBLE performed by Omkar Chester MD at [place] [address] N/A 02/11/2022    FLEXIBLE SIGMOIDOSCOPY performed by Omkar Chester MD at [facility] N/A 10/12/2022    FLEXIBLE SIGMOIDOSCOPY performed by Omkar Chester MD at [facility]    HX COLONOSCOPY      HX GI  02/2022    Robotic low anterior resection, diverting loop ileostomy, splenic flexure mobilization. HX INTRACRANIAL ANEURYSM REPAIR      age 10       History reviewed. No pertinent family history. Social History     Socioeconomic History    Marital status:    Tobacco Use    Smoking status: Never    Smokeless tobacco: Never   Vaping Use    Vaping Use: Never used   Substance and Sexual Activity    Alcohol use: Yes     Comment: socially    Drug use: Yes     Types: Marijuana     Comment: marijuana 1/27/22       Review of Systems - neg    Outpatient Medications Marked as Taking for the 10/21/22 encounter [facility] Encounter)   Medication Sig Dispense Refill    dapagliflozin-metformin (Xigduo XR) 10-1,000 mg TBph Xigduo XR 10 mg-1,000 mg tablet,extended release   Take by oral route for 90 days. benazepriL (LOTENSIN) 20 mg tablet 20 mg daily. amLODIPine (NORVASC) 5 mg tablet 5 mg daily.       ergocalciferol (ERGOCALCIFEROL) 1,250 mcg (50,000 unit) capsule Vitamin D2 50,000 unit capsule   TAKE ONE CAPSULE BY MOUTH ONCE WEEKLY      glimepiride (AMARYL) 2 mg tablet Take 2 mg by mouth every morning. No Known Allergies    Vitals:    10/14/22 0940 10/21/22 0703   BP:  116/73   Pulse:  86   Resp:  20   Temp:  98 °F (36.7 °C)   SpO2:  98%   Weight: 92.1 kg (203 lb) 93.4 kg (206 lb)   Height: 5' 7\" (1.702 m) 5' 7\" (1.702 m)       Physical Exam  Constitutional:       Appearance: He is well-developed. HENT:      Head: Normocephalic and atraumatic. Eyes:      Conjunctiva/sclera: Conjunctivae normal.   Abdominal:      General: There is no distension. Palpations: Abdomen is soft. There is no mass. Tenderness: There is no abdominal tenderness. There is no guarding. Musculoskeletal:         General: Normal range of motion. Lymphadenopathy:      Cervical: No cervical adenopathy. Skin:     General: Skin is warm and dry. Neurological:      Sensory: No sensory deficit. Psychiatric:         Speech: Speech normal.       Assessment / Plan    Ileostomy reversal, mediport removal    The diagnoses and plan were discussed with the patient. All questions answered. Plan of care agreed to by all concerned.

## 2022-10-22 LAB
ANION GAP SERPL CALC-SCNC: 7 MMOL/L (ref 3–18)
BUN SERPL-MCNC: 7 MG/DL (ref 7–18)
BUN/CREAT SERPL: 9 (ref 12–20)
CALCIUM SERPL-MCNC: 8.1 MG/DL (ref 8.5–10.1)
CHLORIDE SERPL-SCNC: 103 MMOL/L (ref 100–111)
CO2 SERPL-SCNC: 27 MMOL/L (ref 21–32)
CREAT SERPL-MCNC: 0.75 MG/DL (ref 0.6–1.3)
ERYTHROCYTE [DISTWIDTH] IN BLOOD BY AUTOMATED COUNT: 14.6 % (ref 11.6–14.5)
GLUCOSE BLD STRIP.AUTO-MCNC: 103 MG/DL (ref 70–110)
GLUCOSE BLD STRIP.AUTO-MCNC: 108 MG/DL (ref 70–110)
GLUCOSE BLD STRIP.AUTO-MCNC: 116 MG/DL (ref 70–110)
GLUCOSE BLD STRIP.AUTO-MCNC: 146 MG/DL (ref 70–110)
GLUCOSE BLD STRIP.AUTO-MCNC: 98 MG/DL (ref 70–110)
GLUCOSE SERPL-MCNC: 104 MG/DL (ref 74–99)
HCT VFR BLD AUTO: 41.9 % (ref 36–48)
HGB BLD-MCNC: 13.8 G/DL (ref 13–16)
MAGNESIUM SERPL-MCNC: 2.1 MG/DL (ref 1.6–2.6)
MCH RBC QN AUTO: 29.1 PG (ref 24–34)
MCHC RBC AUTO-ENTMCNC: 32.9 G/DL (ref 31–37)
MCV RBC AUTO: 88.2 FL (ref 78–100)
NRBC # BLD: 0 K/UL (ref 0–0.01)
NRBC BLD-RTO: 0 PER 100 WBC
PHOSPHATE SERPL-MCNC: 4 MG/DL (ref 2.5–4.9)
PLATELET # BLD AUTO: 228 K/UL (ref 135–420)
PMV BLD AUTO: 10 FL (ref 9.2–11.8)
POTASSIUM SERPL-SCNC: 3.7 MMOL/L (ref 3.5–5.5)
RBC # BLD AUTO: 4.75 M/UL (ref 4.35–5.65)
SODIUM SERPL-SCNC: 137 MMOL/L (ref 136–145)
WBC # BLD AUTO: 7.9 K/UL (ref 4.6–13.2)

## 2022-10-22 PROCEDURE — 83735 ASSAY OF MAGNESIUM: CPT

## 2022-10-22 PROCEDURE — 74011000250 HC RX REV CODE- 250: Performed by: COLON & RECTAL SURGERY

## 2022-10-22 PROCEDURE — 74011250637 HC RX REV CODE- 250/637: Performed by: COLON & RECTAL SURGERY

## 2022-10-22 PROCEDURE — 36415 COLL VENOUS BLD VENIPUNCTURE: CPT

## 2022-10-22 PROCEDURE — 2709999900 HC NON-CHARGEABLE SUPPLY

## 2022-10-22 PROCEDURE — 84100 ASSAY OF PHOSPHORUS: CPT

## 2022-10-22 PROCEDURE — 85027 COMPLETE CBC AUTOMATED: CPT

## 2022-10-22 PROCEDURE — 80048 BASIC METABOLIC PNL TOTAL CA: CPT

## 2022-10-22 PROCEDURE — 74011250636 HC RX REV CODE- 250/636: Performed by: COLON & RECTAL SURGERY

## 2022-10-22 PROCEDURE — 65270000046 HC RM TELEMETRY

## 2022-10-22 RX ADMIN — HEPARIN SODIUM 5000 UNITS: 5000 INJECTION INTRAVENOUS; SUBCUTANEOUS at 09:28

## 2022-10-22 RX ADMIN — MORPHINE SULFATE 2 MG: 2 INJECTION, SOLUTION INTRAMUSCULAR; INTRAVENOUS at 04:31

## 2022-10-22 RX ADMIN — GABAPENTIN 300 MG: 300 CAPSULE ORAL at 09:28

## 2022-10-22 RX ADMIN — ACETAMINOPHEN 1000 MG: 500 TABLET ORAL at 06:28

## 2022-10-22 RX ADMIN — ACETAMINOPHEN 1000 MG: 500 TABLET ORAL at 23:32

## 2022-10-22 RX ADMIN — WATER 2 G: 1 INJECTION INTRAMUSCULAR; INTRAVENOUS; SUBCUTANEOUS at 09:27

## 2022-10-22 RX ADMIN — ACETAMINOPHEN 1000 MG: 500 TABLET ORAL at 18:03

## 2022-10-22 RX ADMIN — GABAPENTIN 300 MG: 300 CAPSULE ORAL at 15:32

## 2022-10-22 RX ADMIN — SODIUM CHLORIDE, POTASSIUM CHLORIDE, SODIUM LACTATE AND CALCIUM CHLORIDE 125 ML/HR: 600; 310; 30; 20 INJECTION, SOLUTION INTRAVENOUS at 09:38

## 2022-10-22 RX ADMIN — FAMOTIDINE 20 MG: 10 INJECTION, SOLUTION INTRAVENOUS at 09:29

## 2022-10-22 RX ADMIN — GABAPENTIN 300 MG: 300 CAPSULE ORAL at 23:18

## 2022-10-22 RX ADMIN — MORPHINE SULFATE 2 MG: 2 INJECTION, SOLUTION INTRAMUSCULAR; INTRAVENOUS at 15:41

## 2022-10-22 RX ADMIN — SODIUM CHLORIDE, POTASSIUM CHLORIDE, SODIUM LACTATE AND CALCIUM CHLORIDE 125 ML/HR: 600; 310; 30; 20 INJECTION, SOLUTION INTRAVENOUS at 18:16

## 2022-10-22 RX ADMIN — FAMOTIDINE 20 MG: 10 INJECTION, SOLUTION INTRAVENOUS at 23:18

## 2022-10-22 RX ADMIN — HEPARIN SODIUM 5000 UNITS: 5000 INJECTION INTRAVENOUS; SUBCUTANEOUS at 15:32

## 2022-10-22 RX ADMIN — AMLODIPINE BESYLATE 5 MG: 5 TABLET ORAL at 09:28

## 2022-10-22 RX ADMIN — METRONIDAZOLE 500 MG: 500 INJECTION, SOLUTION INTRAVENOUS at 09:27

## 2022-10-22 NOTE — PROGRESS NOTES
Patient seen and examined. He is feeling well. He stated that he has very minimal abdominal pain and he has been taking ice chips and sips of water and he feels hungry. He did not pass flatus or have bowel movement. He had a temperature documented of 33.8 which I am assuming it was 38.8 but it was typed wrong. He had mild tachycardia of 101 but currently his temperature is 36.6 and his heart rate is 90 so most likely that was atelectasis. His abdomen is soft and nontender and there is a dressing that is clean. Plan:  I placed an order for clear liquid diet and I told the patient to take it slow. Wound care. There is an order for dressing changes.   Ambulation

## 2022-10-22 NOTE — PROGRESS NOTES
Report given to RN [facility] Patient will transfer to room 352. Patient stable .

## 2022-10-22 NOTE — PROGRESS NOTES
Wound Prevention Checklist    Patient: Nick Monroy (69 y.o. male)  Date: 10/22/2022  Diagnosis: Rectal cancer ([facility] [C20]  Attention to ileostomy ([facility] [Z43.2] <principal problem not specified>    Precautions:         []  Heel prevention boots placed on patient    [x]  Patient turned q2h during shift    []  Lift team ordered    []  Patient on [place] bed/Specialty bed    []  Each Wound is documented during shift (Stage, Color, drainage, odor, measurements, and dressings)    [x]  Dual skin check done with Lan Ochoa RN

## 2022-10-22 NOTE — OP NOTES
[facility]  OPERATIVE REPORT    Name:  Kobe Ohara  MR#:   [id number]  :  1980  ACCOUNT #:  [de-identified]  DATE OF SERVICE:  10/21/2022    PREOPERATIVE DIAGNOSES:  Rectal cancer, attention to ileostomy. POSTOPERATIVE DIAGNOSES:  Rectal cancer, attention to ileostomy. PROCEDURE PERFORMED:  Loop ileostomy reversal, MediPort removal.    SURGEON:  Eliz Maza MD.    ASSISTANT:  Ernestine Nunes. ANESTHESIA:  General.    COMPLICATIONS:  None. SPECIMENS REMOVED:  MediPort, MediPort capsule and ileostomy to Pathology. IMPLANTS:  None. ESTIMATED BLOOD LOSS:  50 mL. FINDINGS:  Ileostomy. INDICATIONS:  The patient is a 49-year-old male who underwent robotic low anterior resection for a rectal cancer followed by adjuvant chemotherapy. He now presents for ileostomy reversal and MediPort removal.  I explained the risks to the patient including bleeding and infection. He understood and wished to proceed. DESCRIPTION OF PROCEDURE:  The patient was properly identified in the holding area and brought to the operating room. He was laid supine on the operating room table. General anesthesia was administered. Forde catheter was placed. Right chest was prepped and draped in the usual sterile fashion. Local anesthetic was injected and a 15-blade was used to make an incision over a prior incision for MediPort placement. Subcutaneous tissues were dissected. The MediPort device along with its catheter were successfully removed. We excised the MediPort capsule with cautery. Hemostasis was assured with cautery and the wound was then closed with 4-0 Monocryl subcuticular suture. Steri-Strips were applied. The abdomen was then prepped and draped in the usual sterile fashion. We previously oversewn the ileostomy site with 2-0 Vicryl suture. We incised around this with cautery and dissected through the subcutaneous tissues and completely freed up the ileostomy.   We stapled across it proximally and distally with a 75-mm blue load WILIAN stapler and transected the mesentery after ligating it with 0 Vicryl ties. We then created a standard side-to-side functional end-to-end anastomosis between the remaining pieces of ileum. Common defect was closed with TX-60 linear stapler. This staple line was then oversewn with 3-0 Vicryl suture. The mesenteric rent was closed with 3-0 Vicryl suture and a crotch stitch was placed as well. This was then placed back into the abdominal cavity. We cleared away the fascia from the surrounding subcutaneous tissues and hernia sac and reapproximated the fascia with #1 PDS suture in a running fashion. Subcutaneous tissues were irrigated. The skin was bisected with a 0 Prolene suture and packed with half-strength Betadine-soaked Kerlix. Dry sterile dressings were applied. The patient tolerated the procedure well. All instrument, sponge, and needle counts were correct at the end of the case x2. The patient awoke from anesthesia, was extubated, and transported to PACU in stable condition.         Kaleb Casanova MD      [id number]  D:  10/21/2022 10:11  T:  10/21/2022 20:45  JOB #:  [id number]

## 2022-10-22 NOTE — PROGRESS NOTES
Problem: Diabetes Self-Management  Goal: *Disease process and treatment process  Description: Define diabetes and identify own type of diabetes; list 3 options for treating diabetes. Outcome: Progressing Towards Goal  Goal: *Incorporating nutritional management into lifestyle  Description: Describe effect of type, amount and timing of food on blood glucose; list 3 methods for planning meals. Outcome: Progressing Towards Goal  Goal: *Incorporating physical activity into lifestyle  Description: State effect of exercise on blood glucose levels. Outcome: Progressing Towards Goal  Goal: *Developing strategies to promote health/change behavior  Description: Define the ABC's of diabetes; identify appropriate screenings, schedule and personal plan for screenings. Outcome: Progressing Towards Goal  Goal: *Using medications safely  Description: State effect of diabetes medications on diabetes; name diabetes medication taking, action and side effects. Outcome: Progressing Towards Goal  Goal: *Monitoring blood glucose, interpreting and using results  Description: Identify recommended blood glucose targets  and personal targets. Outcome: Progressing Towards Goal  Goal: *Prevention, detection, treatment of acute complications  Description: List symptoms of hyper- and hypoglycemia; describe how to treat low blood sugar and actions for lowering  high blood glucose level. Outcome: Progressing Towards Goal  Goal: *Prevention, detection and treatment of chronic complications  Description: Define the natural course of diabetes and describe the relationship of blood glucose levels to long term complications of diabetes.   Outcome: Progressing Towards Goal  Goal: *Developing strategies to address psychosocial issues  Description: Describe feelings about living with diabetes; identify support needed and support network  Outcome: Progressing Towards Goal  Goal: *Insulin pump training  Outcome: Progressing Towards Goal  Goal: *Sick day guidelines  Outcome: Progressing Towards Goal  Goal: *Patient Specific Goal (EDIT GOAL, INSERT TEXT)  Outcome: Progressing Towards Goal     Problem: Patient Education: Go to Patient Education Activity  Goal: Patient/Family Education  Outcome: Progressing Towards Goal     Problem: Diabetes Self-Management  Goal: *Disease process and treatment process  Description: Define diabetes and identify own type of diabetes; list 3 options for treating diabetes. Outcome: Progressing Towards Goal  Goal: *Incorporating nutritional management into lifestyle  Description: Describe effect of type, amount and timing of food on blood glucose; list 3 methods for planning meals. Outcome: Progressing Towards Goal  Goal: *Incorporating physical activity into lifestyle  Description: State effect of exercise on blood glucose levels. Outcome: Progressing Towards Goal  Goal: *Developing strategies to promote health/change behavior  Description: Define the ABC's of diabetes; identify appropriate screenings, schedule and personal plan for screenings. Outcome: Progressing Towards Goal  Goal: *Using medications safely  Description: State effect of diabetes medications on diabetes; name diabetes medication taking, action and side effects. Outcome: Progressing Towards Goal  Goal: *Monitoring blood glucose, interpreting and using results  Description: Identify recommended blood glucose targets  and personal targets. Outcome: Progressing Towards Goal  Goal: *Prevention, detection, treatment of acute complications  Description: List symptoms of hyper- and hypoglycemia; describe how to treat low blood sugar and actions for lowering  high blood glucose level. Outcome: Progressing Towards Goal  Goal: *Prevention, detection and treatment of chronic complications  Description: Define the natural course of diabetes and describe the relationship of blood glucose levels to long term complications of diabetes.   Outcome: Progressing Towards Goal  Goal: *Developing strategies to address psychosocial issues  Description: Describe feelings about living with diabetes; identify support needed and support network  Outcome: Progressing Towards Goal  Goal: *Insulin pump training  Outcome: Progressing Towards Goal  Goal: *Sick day guidelines  Outcome: Progressing Towards Goal  Goal: *Patient Specific Goal (EDIT GOAL, INSERT TEXT)  Outcome: Progressing Towards Goal     Problem: Patient Education: Go to Patient Education Activity  Goal: Patient/Family Education  Outcome: Progressing Towards Goal     Problem: Falls - Risk of  Goal: *Absence of Falls  Description: Document Andie Fall Risk and appropriate interventions in the flowsheet. Outcome: Progressing Towards Goal  Note: Fall Risk Interventions:            Medication Interventions: Patient to call before getting OOB, Teach patient to arise slowly    Elimination Interventions: Bed/chair exit alarm, Call light in reach, Toileting schedule/hourly rounds              Problem: Patient Education: Go to Patient Education Activity  Goal: Patient/Family Education  Outcome: Progressing Towards Goal     Problem: Pain  Goal: *Control of Pain  Outcome: Progressing Towards Goal  Goal: *PALLIATIVE CARE:  Alleviation of Pain  Outcome: Progressing Towards Goal     Problem: Patient Education: Go to Patient Education Activity  Goal: Patient/Family Education  Outcome: Progressing Towards Goal     Problem: Patient Education: Go to Patient Education Activity  Goal: Patient/Family Education  Outcome: Progressing Towards Goal     Problem: Pressure Injury - Risk of  Goal: *Prevention of pressure injury  Description: Document Shalom Scale and appropriate interventions in the flowsheet.   Outcome: Progressing Towards Goal     Problem: Patient Education: Go to Patient Education Activity  Goal: Patient/Family Education  Outcome: Progressing Towards Goal

## 2022-10-22 NOTE — PROGRESS NOTES
Bedside and Verbal shift change report from Jarrett Champion and Nyla Barber Se (offgoing nurse). Report included the following information Kardex.  Lab or report  Mar.   2000 Assessment done 2130 Medicated for APain

## 2022-10-23 LAB
ANION GAP SERPL CALC-SCNC: 6 MMOL/L (ref 3–18)
BUN SERPL-MCNC: 7 MG/DL (ref 7–18)
BUN/CREAT SERPL: 9 (ref 12–20)
CALCIUM SERPL-MCNC: 8.2 MG/DL (ref 8.5–10.1)
CHLORIDE SERPL-SCNC: 107 MMOL/L (ref 100–111)
CO2 SERPL-SCNC: 28 MMOL/L (ref 21–32)
CREAT SERPL-MCNC: 0.76 MG/DL (ref 0.6–1.3)
ERYTHROCYTE [DISTWIDTH] IN BLOOD BY AUTOMATED COUNT: 14.7 % (ref 11.6–14.5)
GLUCOSE BLD STRIP.AUTO-MCNC: 113 MG/DL (ref 70–110)
GLUCOSE BLD STRIP.AUTO-MCNC: 137 MG/DL (ref 70–110)
GLUCOSE BLD STRIP.AUTO-MCNC: 147 MG/DL (ref 70–110)
GLUCOSE BLD STRIP.AUTO-MCNC: 211 MG/DL (ref 70–110)
GLUCOSE BLD STRIP.AUTO-MCNC: 600 MG/DL (ref 70–110)
GLUCOSE SERPL-MCNC: 142 MG/DL (ref 74–99)
HCT VFR BLD AUTO: 40.4 % (ref 36–48)
HGB BLD-MCNC: 13.3 G/DL (ref 13–16)
MAGNESIUM SERPL-MCNC: 2.2 MG/DL (ref 1.6–2.6)
MCH RBC QN AUTO: 28.7 PG (ref 24–34)
MCHC RBC AUTO-ENTMCNC: 32.9 G/DL (ref 31–37)
MCV RBC AUTO: 87.1 FL (ref 78–100)
NRBC # BLD: 0 K/UL (ref 0–0.01)
NRBC BLD-RTO: 0 PER 100 WBC
PHOSPHATE SERPL-MCNC: 3.1 MG/DL (ref 2.5–4.9)
PLATELET # BLD AUTO: 224 K/UL (ref 135–420)
PMV BLD AUTO: 9.8 FL (ref 9.2–11.8)
POTASSIUM SERPL-SCNC: 3.5 MMOL/L (ref 3.5–5.5)
RBC # BLD AUTO: 4.64 M/UL (ref 4.35–5.65)
SODIUM SERPL-SCNC: 141 MMOL/L (ref 136–145)
WBC # BLD AUTO: 6 K/UL (ref 4.6–13.2)

## 2022-10-23 PROCEDURE — 84100 ASSAY OF PHOSPHORUS: CPT

## 2022-10-23 PROCEDURE — 74011250636 HC RX REV CODE- 250/636: Performed by: COLON & RECTAL SURGERY

## 2022-10-23 PROCEDURE — 2709999900 HC NON-CHARGEABLE SUPPLY

## 2022-10-23 PROCEDURE — 80048 BASIC METABOLIC PNL TOTAL CA: CPT

## 2022-10-23 PROCEDURE — 74011250637 HC RX REV CODE- 250/637: Performed by: COLON & RECTAL SURGERY

## 2022-10-23 PROCEDURE — 65270000046 HC RM TELEMETRY

## 2022-10-23 PROCEDURE — 74011000250 HC RX REV CODE- 250: Performed by: COLON & RECTAL SURGERY

## 2022-10-23 PROCEDURE — 83735 ASSAY OF MAGNESIUM: CPT

## 2022-10-23 PROCEDURE — 82962 GLUCOSE BLOOD TEST: CPT

## 2022-10-23 PROCEDURE — 85027 COMPLETE CBC AUTOMATED: CPT

## 2022-10-23 PROCEDURE — 74011636637 HC RX REV CODE- 636/637: Performed by: COLON & RECTAL SURGERY

## 2022-10-23 PROCEDURE — 36415 COLL VENOUS BLD VENIPUNCTURE: CPT

## 2022-10-23 RX ORDER — INSULIN LISPRO 100 [IU]/ML
INJECTION, SOLUTION INTRAVENOUS; SUBCUTANEOUS
Status: DISCONTINUED | OUTPATIENT
Start: 2022-10-23 | End: 2022-10-24 | Stop reason: HOSPADM

## 2022-10-23 RX ADMIN — HEPARIN SODIUM 5000 UNITS: 5000 INJECTION INTRAVENOUS; SUBCUTANEOUS at 15:58

## 2022-10-23 RX ADMIN — ACETAMINOPHEN 1000 MG: 500 TABLET ORAL at 11:24

## 2022-10-23 RX ADMIN — SODIUM CHLORIDE, POTASSIUM CHLORIDE, SODIUM LACTATE AND CALCIUM CHLORIDE 125 ML/HR: 600; 310; 30; 20 INJECTION, SOLUTION INTRAVENOUS at 19:54

## 2022-10-23 RX ADMIN — GABAPENTIN 300 MG: 300 CAPSULE ORAL at 21:08

## 2022-10-23 RX ADMIN — GABAPENTIN 300 MG: 300 CAPSULE ORAL at 09:38

## 2022-10-23 RX ADMIN — HEPARIN SODIUM 5000 UNITS: 5000 INJECTION INTRAVENOUS; SUBCUTANEOUS at 09:39

## 2022-10-23 RX ADMIN — Medication 4 UNITS: at 18:25

## 2022-10-23 RX ADMIN — FAMOTIDINE 20 MG: 10 INJECTION, SOLUTION INTRAVENOUS at 21:08

## 2022-10-23 RX ADMIN — FAMOTIDINE 20 MG: 10 INJECTION, SOLUTION INTRAVENOUS at 09:42

## 2022-10-23 RX ADMIN — HEPARIN SODIUM 5000 UNITS: 5000 INJECTION INTRAVENOUS; SUBCUTANEOUS at 00:00

## 2022-10-23 RX ADMIN — ACETAMINOPHEN 1000 MG: 500 TABLET ORAL at 17:48

## 2022-10-23 RX ADMIN — SODIUM CHLORIDE, POTASSIUM CHLORIDE, SODIUM LACTATE AND CALCIUM CHLORIDE 125 ML/HR: 600; 310; 30; 20 INJECTION, SOLUTION INTRAVENOUS at 11:24

## 2022-10-23 RX ADMIN — GABAPENTIN 300 MG: 300 CAPSULE ORAL at 15:58

## 2022-10-23 RX ADMIN — AMLODIPINE BESYLATE 5 MG: 5 TABLET ORAL at 09:38

## 2022-10-23 NOTE — PROGRESS NOTES
Patient seen and examined. He stated that he feels great. He is very hungry for regular food. He is tolerating clear liquid diet and he has been passing flatus. He denies any abdominal pain. His vitals shows no fever or tachycardia. His abdomen is soft and nontender.     Plan:    I placed an order to DC the Forde  I advance his diet to regular  Ambulation  Wet-to-dry dressing at the site of the previous ileostomy  Discharge planning tomorrow

## 2022-10-23 NOTE — PROGRESS NOTES
Report received from , RN. Patient alert and oriented. Patient ambulated to restroom, bowel movement x 1. Advised patient to ambulate in reyes, per physician order. Forde Discontinued    Dressing changed, patient tolerated well. 1125- Report given to STACY Sidhu. Patient sitting up in chair with no signs of pain or distress.

## 2022-10-23 NOTE — ROUTINE PROCESS
Bedside shift change report given to Janice Gee RN (oncoming nurse) by Leanna Salinas RN (offgoing nurse). Report included the following information SBAR, Kardex, Procedure Summary, Intake/Output, and MAR.

## 2022-10-24 VITALS
RESPIRATION RATE: 14 BRPM | WEIGHT: 206 LBS | SYSTOLIC BLOOD PRESSURE: 145 MMHG | BODY MASS INDEX: 32.33 KG/M2 | HEIGHT: 67 IN | HEART RATE: 83 BPM | OXYGEN SATURATION: 97 % | DIASTOLIC BLOOD PRESSURE: 93 MMHG | TEMPERATURE: 97 F

## 2022-10-24 LAB
ANION GAP SERPL CALC-SCNC: 4 MMOL/L (ref 3–18)
BUN SERPL-MCNC: 8 MG/DL (ref 7–18)
BUN/CREAT SERPL: 11 (ref 12–20)
CALCIUM SERPL-MCNC: 8.3 MG/DL (ref 8.5–10.1)
CHLORIDE SERPL-SCNC: 109 MMOL/L (ref 100–111)
CO2 SERPL-SCNC: 27 MMOL/L (ref 21–32)
CREAT SERPL-MCNC: 0.75 MG/DL (ref 0.6–1.3)
ERYTHROCYTE [DISTWIDTH] IN BLOOD BY AUTOMATED COUNT: 14.6 % (ref 11.6–14.5)
GLUCOSE BLD STRIP.AUTO-MCNC: 100 MG/DL (ref 70–110)
GLUCOSE SERPL-MCNC: 102 MG/DL (ref 74–99)
HCT VFR BLD AUTO: 37.8 % (ref 36–48)
HGB BLD-MCNC: 12.2 G/DL (ref 13–16)
MAGNESIUM SERPL-MCNC: 1.9 MG/DL (ref 1.6–2.6)
MCH RBC QN AUTO: 28.4 PG (ref 24–34)
MCHC RBC AUTO-ENTMCNC: 32.3 G/DL (ref 31–37)
MCV RBC AUTO: 87.9 FL (ref 78–100)
NRBC # BLD: 0 K/UL (ref 0–0.01)
NRBC BLD-RTO: 0 PER 100 WBC
PHOSPHATE SERPL-MCNC: 4.7 MG/DL (ref 2.5–4.9)
PLATELET # BLD AUTO: 227 K/UL (ref 135–420)
PMV BLD AUTO: 10.2 FL (ref 9.2–11.8)
POTASSIUM SERPL-SCNC: 3.4 MMOL/L (ref 3.5–5.5)
RBC # BLD AUTO: 4.3 M/UL (ref 4.35–5.65)
SODIUM SERPL-SCNC: 140 MMOL/L (ref 136–145)
WBC # BLD AUTO: 5.9 K/UL (ref 4.6–13.2)

## 2022-10-24 PROCEDURE — 80048 BASIC METABOLIC PNL TOTAL CA: CPT

## 2022-10-24 PROCEDURE — 90471 IMMUNIZATION ADMIN: CPT

## 2022-10-24 PROCEDURE — 74011250637 HC RX REV CODE- 250/637: Performed by: COLON & RECTAL SURGERY

## 2022-10-24 PROCEDURE — 36415 COLL VENOUS BLD VENIPUNCTURE: CPT

## 2022-10-24 PROCEDURE — 74011250636 HC RX REV CODE- 250/636: Performed by: COLON & RECTAL SURGERY

## 2022-10-24 PROCEDURE — 82962 GLUCOSE BLOOD TEST: CPT

## 2022-10-24 PROCEDURE — 74011000636 HC RX REV CODE- 636: Performed by: COLON & RECTAL SURGERY

## 2022-10-24 PROCEDURE — 90686 IIV4 VACC NO PRSV 0.5 ML IM: CPT | Performed by: COLON & RECTAL SURGERY

## 2022-10-24 PROCEDURE — 74011000250 HC RX REV CODE- 250: Performed by: COLON & RECTAL SURGERY

## 2022-10-24 PROCEDURE — 83735 ASSAY OF MAGNESIUM: CPT

## 2022-10-24 PROCEDURE — 85027 COMPLETE CBC AUTOMATED: CPT

## 2022-10-24 PROCEDURE — 84100 ASSAY OF PHOSPHORUS: CPT

## 2022-10-24 RX ORDER — OXYCODONE HYDROCHLORIDE 5 MG/1
5 TABLET ORAL
Qty: 20 TABLET | Refills: 0 | Status: SHIPPED | OUTPATIENT
Start: 2022-10-24 | End: 2022-10-27

## 2022-10-24 RX ORDER — GABAPENTIN 300 MG/1
300 CAPSULE ORAL 3 TIMES DAILY
Qty: 21 CAPSULE | Refills: 0 | Status: SHIPPED | OUTPATIENT
Start: 2022-10-24 | End: 2022-10-31

## 2022-10-24 RX ORDER — ACETAMINOPHEN 500 MG
1000 TABLET ORAL EVERY 6 HOURS
Qty: 56 TABLET | Refills: 0 | Status: SHIPPED | OUTPATIENT
Start: 2022-10-24 | End: 2022-10-31

## 2022-10-24 RX ADMIN — AMLODIPINE BESYLATE 5 MG: 5 TABLET ORAL at 08:18

## 2022-10-24 RX ADMIN — HEPARIN SODIUM 5000 UNITS: 5000 INJECTION INTRAVENOUS; SUBCUTANEOUS at 01:05

## 2022-10-24 RX ADMIN — ACETAMINOPHEN 1000 MG: 500 TABLET ORAL at 01:05

## 2022-10-24 RX ADMIN — ACETAMINOPHEN 1000 MG: 500 TABLET ORAL at 06:10

## 2022-10-24 RX ADMIN — FAMOTIDINE 20 MG: 10 INJECTION, SOLUTION INTRAVENOUS at 08:19

## 2022-10-24 RX ADMIN — INFLUENZA VIRUS VACCINE 0.5 ML: 15; 15; 15; 15 SUSPENSION INTRAMUSCULAR at 11:21

## 2022-10-24 RX ADMIN — HEPARIN SODIUM 5000 UNITS: 5000 INJECTION INTRAVENOUS; SUBCUTANEOUS at 08:19

## 2022-10-24 RX ADMIN — SODIUM CHLORIDE, POTASSIUM CHLORIDE, SODIUM LACTATE AND CALCIUM CHLORIDE 125 ML/HR: 600; 310; 30; 20 INJECTION, SOLUTION INTRAVENOUS at 06:11

## 2022-10-24 RX ADMIN — GABAPENTIN 300 MG: 300 CAPSULE ORAL at 08:18

## 2022-10-24 NOTE — DISCHARGE INSTRUCTIONS
Instructions After Surgery    No heavy lifting (nothing more than 10-15 lbs)    Low fiber diet - avoid raw fruits and vegetables, but small amounts of cooked vegetables are ok. Eat white bread products instead of wheat bread products. Meats, fish, chicken, noodles are ok. Showers over your wounds are ok, but avoid submerging wounds in pools or baths.  The strips over your wound will fall off on their own or I will remove them in the office    Wet to dry dressings daily    Make an office follow up appointment in 2 weeks

## 2022-10-24 NOTE — ROUTINE PROCESS
Bedside and Verbal shift change report given to Geri Hickman RN (oncoming nurse) by Drew Lovell RN (offgoing nurse). Report included the following information Kardex, Intake/Output, MAR, and Recent Results.

## 2022-10-24 NOTE — PROGRESS NOTES
Patient discharged home. Discharge instructions reviewed with patient at bedside. All questions asked and answered. Flu shot given. PIV removed intact. Waiting on transport.

## 2022-10-24 NOTE — DISCHARGE SUMMARY
Colon and Rectal Surgery Discharge Summary     Patient: Amadeo Castillo MRN: [id number]  SSN: [id number]    YOB: 1980  Age: 35 y.o. Sex: male       Admit Date: 10/21/2022    Discharge Date: 10/24/2022      Admission Diagnoses: Rectal cancer ([facility] Ashanti Lin; Attention to ileostomy [facility]) [Z43.2]    Discharge Diagnoses: Same    Procedure: Loop ileostomy reversal  Problem List as of 10/24/2022 Date Reviewed: 4/4/2022            Codes Class Noted - Resolved    Rectal cancer ([facility] ICD-10-CM: C20  ICD-9-CM: 154.1  2/11/2022 - Present        Sinusitis acute ICD-9-CM: 461.9  1/18/2013 - Present            Discharge Condition: Good    Hospital Course: 2 OR for above. Diet advanced. Remained afebrile. Passing bowels prior to discharge. Consults: None    Significant Diagnostic Studies: None    Disposition: home    Discharge Medications:   Current Discharge Medication List        START taking these medications    Details   acetaminophen (TYLENOL) 500 mg tablet Take 2 Tablets by mouth every six (6) hours for 7 days. Qty: 56 Tablet, Refills: 0      gabapentin (NEURONTIN) 300 mg capsule Take 1 Capsule by mouth three (3) times daily for 7 days. Max Daily Amount: 900 mg. Qty: 21 Capsule, Refills: 0    Associated Diagnoses: Rectal cancer ([facility])      oxyCODONE IR (ROXICODONE) 5 mg immediate release tablet Take 1 Tablet by mouth every four (4) hours as needed for Pain for up to 3 days. Max Daily Amount: 30 mg.  Qty: 20 Tablet, Refills: 0    Associated Diagnoses: Rectal cancer ([facility]           CONTINUE these medications which have NOT CHANGED    Details   dapagliflozin-metformin (Xigduo XR) 10-1,000 mg TBph Xigduo XR 10 mg-1,000 mg tablet,extended release   Take by oral route for 90 days. benazepriL (LOTENSIN) 20 mg tablet 20 mg daily. amLODIPine (NORVASC) 5 mg tablet 10 mg daily. ergocalciferol (ERGOCALCIFEROL) 1,250 mcg (50,000 unit) capsule every seven (7) days. Pt takes every Monday. glimepiride (AMARYL) 2 mg tablet Take 4 mg by mouth daily. Activity: No heavy lifting for 6 weeks  Diet: Regular Diet  Wound Care: Wet-to-dry dressings daily with home health    Follow-up Appointments   Procedures    FOLLOW UP VISIT Appointment in: Two Weeks     Standing Status:   Standing     Number of Occurrences:   1     Order Specific Question:   Appointment in     Answer:    Two Weeks       Signed By: Carlos Sheehan MD     October 24, 2022

## 2022-10-24 NOTE — PROGRESS NOTES
Home Health order discussed with the patient. [place] [facility] for [facility]. Referral placed in queue. Notified Claribel. 0990-Sent perfect serve message to Dr. Vitaly Bustillo requesting updated home health orders. [id number]-  Received message from Xochitl Slaughter Dr with [facility]. [facility] [employer] is out of network with [employer], unable to accept. [address] referral sent via [facility] to find an accepting agency.  will continue to monitor and assist with transition of care needs.     WHITNEY BashirN, RN  Care Management

## 2022-10-24 NOTE — PROGRESS NOTES
Home Health order sent out via JAIDEN to eight home health agencies. No accepting agency. Decline reason, payor not accepted. [facility] declined d/t limites staffing. CM will continue to sent referrals via JAIDEN in hopes of finding an accepting agency. 18- Dr. Dennie Decamp notified via Perfect Serve. Also informed wife was taught how to change dressing wet to dry and extra supplies were sent wit the patient. Home Health referrals sent via JAIDEN to 17 home health agencies, no accepting agency. [id number] [facility], notified there are no accepting home health agencies. [facility] ( pt had before) declined due to limited staffing. [facility] said she is comfortable changing the wet-to-dry dressings daily. 1610- Updated Dr. Dennie Decamp of no accepting agency and this CM spoke with wife again who will change dressings.  will continue to monitor and assist with transition of care needs.     WHITNEY HewittN, RN  Care Management

## 2022-10-24 NOTE — PROGRESS NOTES
Problem: Diabetes Self-Management  Goal: *Disease process and treatment process  Description: Define diabetes and identify own type of diabetes; list 3 options for treating diabetes. 10/24/2022 0038 by Jose Carlos Vergara RN  Outcome: Progressing Towards Goal  10/24/2022 0038 by Jose Carlos Vergara RN  Outcome: Progressing Towards Goal  Goal: *Incorporating nutritional management into lifestyle  Description: Describe effect of type, amount and timing of food on blood glucose; list 3 methods for planning meals. 10/24/2022 0038 by Jose Carlos Vergara RN  Outcome: Progressing Towards Goal  10/24/2022 0038 by Jose Carlos Vergara RN  Outcome: Progressing Towards Goal  Goal: *Incorporating physical activity into lifestyle  Description: State effect of exercise on blood glucose levels. 10/24/2022 0038 by Jose Carlos Vergara RN  Outcome: Progressing Towards Goal  10/24/2022 0038 by Jose Carlos Vergara RN  Outcome: Progressing Towards Goal  Goal: *Developing strategies to promote health/change behavior  Description: Define the ABC's of diabetes; identify appropriate screenings, schedule and personal plan for screenings. 10/24/2022 0038 by Jose Carlos Vergara RN  Outcome: Progressing Towards Goal  10/24/2022 0038 by Jose Carlos Vergara RN  Outcome: Progressing Towards Goal  Goal: *Using medications safely  Description: State effect of diabetes medications on diabetes; name diabetes medication taking, action and side effects. 10/24/2022 0038 by Jose Carlos Vergara RN  Outcome: Progressing Towards Goal  10/24/2022 0038 by Jose Carlos Vergara RN  Outcome: Progressing Towards Goal  Goal: *Monitoring blood glucose, interpreting and using results  Description: Identify recommended blood glucose targets  and personal targets.   10/24/2022 0038 by Jose Carlos Vergara RN  Outcome: Progressing Towards Goal  10/24/2022 0038 by Jose Carlos Vergara RN  Outcome: Progressing Towards Goal  Goal: *Prevention, detection, treatment of acute complications  Description: List symptoms of hyper- and hypoglycemia; describe how to treat low blood sugar and actions for lowering  high blood glucose level. 10/24/2022 0038 by Rachel Hsu RN  Outcome: Progressing Towards Goal  10/24/2022 0038 by Rachel Hsu RN  Outcome: Progressing Towards Goal  Goal: *Prevention, detection and treatment of chronic complications  Description: Define the natural course of diabetes and describe the relationship of blood glucose levels to long term complications of diabetes.   10/24/2022 0038 by Rachel Hsu RN  Outcome: Progressing Towards Goal  10/24/2022 0038 by Rachel Hsu RN  Outcome: Progressing Towards Goal  Goal: *Developing strategies to address psychosocial issues  Description: Describe feelings about living with diabetes; identify support needed and support network  10/24/2022 0038 by Rachel Hsu RN  Outcome: Progressing Towards Goal  10/24/2022 0038 by Rachel Hsu RN  Outcome: Progressing Towards Goal  Goal: *Insulin pump training  10/24/2022 0038 by Rachel Hsu RN  Outcome: Progressing Towards Goal  10/24/2022 0038 by Rachel Hsu RN  Outcome: Progressing Towards Goal  Goal: *Sick day guidelines  10/24/2022 0038 by Rachel Hsu RN  Outcome: Progressing Towards Goal  10/24/2022 0038 by Rachel Hsu RN  Outcome: Progressing Towards Goal  Goal: *Patient Specific Goal (EDIT GOAL, INSERT TEXT)  10/24/2022 0038 by Rachel Hsu RN  Outcome: Progressing Towards Goal  10/24/2022 0038 by Rachel Hsu RN  Outcome: Progressing Towards Goal

## 2022-10-24 NOTE — HOME CARE
Notified  Dheeraj Rodriguez ,RN through Perfect Serve that [facility] is unable to accept [facility] referral due to patient's insurance being out of network with [facility]. AMARILIS BONNER.

## 2022-10-24 NOTE — ROUTINE PROCESS
Bedside and Verbal shift change report given to Thomas Sykes RN (oncoming nurse) by Sada Lee RN (offgoing nurse). Report included the following information SBAR and Recent Results.

## 2022-10-24 NOTE — PROGRESS NOTES
Reason for Admission:  Rectal cancer ([facility] [address] [C20]  Attention to ileostomy ([facility] [address] [Z43.2]                 RUR Score:    7%            Plan for utilizing home health:    yes                      Likelihood of Readmission:   LOW                         Transition of Care Plan:              Initial assessment completed with patient. Cognitive status of patient: oriented to time, place, person and situation. Face sheet information confirmed:  yes. The patient designates wife Phil Earl to participate in his discharge plan and to receive any needed information. This patient lives in a single family home with patient and wife. Patient is able to navigate steps as needed. Prior to hospitalization, patient was considered to be independent with ADLs/IADLS : yes . Patient has a current ACP document on file: no      Healthcare Decision Maker:   Primary Decision Maker: Rock Simmons () - Spouse - [phone]    The patient and wife will be available to transport patient home upon discharge. The patient already has none reported,  medical equipment available in the home. Patient is not currently active with home health. Patient has not stayed in a skilled nursing facility or rehab. Was  stay within last 60 days : no. This patient is on dialysis :no    List of available Home Health agencies were provided and reviewed with the patient prior to discharge. Freedom of choice signed: yes, for [facility] . Currently, the discharge plan is Home with [address] [place] [address]. The patient states that he can obtain his medications from the pharmacy, and take his medications as directed. Patient's current insurance is [facility] [employer]  PCP Verified by CM: Yes (last month)  Mode of Transport at Discharge:  Other (see comment) (wife)  Transition of Care Consult (CM Consult): [address]: Yes  Physical Therapy Consult: No  Occupational Therapy Consult: No  Speech Therapy Consult: No  Support Systems: Spouse/Significant Other  Confirm Follow Up Transport: Family  The Patient and/or Patient Representative was Provided with a Choice of Provider and Agrees with the Discharge Plan?: Yes  Freedom of Choice List was Provided with Basic Dialogue that Supports the Patient's Individualized Plan of Care/Goals, Treatment Preferences and Shares the Quality Data Associated with the Providers?: Yes  Discharge Location  Patient Expects to be Discharged to[de-identified] Home with home health         will continue to monitor and assist with transition of care needs.     WHITNEY ArevaloN, RN  Care Management

## 2022-11-07 ENCOUNTER — OFFICE VISIT (OUTPATIENT)
Dept: SURGERY | Age: 42
End: 2022-11-07
Payer: COMMERCIAL

## 2022-11-07 VITALS
HEART RATE: 83 BPM | RESPIRATION RATE: 18 BRPM | WEIGHT: 210 LBS | HEIGHT: 67 IN | OXYGEN SATURATION: 98 % | SYSTOLIC BLOOD PRESSURE: 119 MMHG | TEMPERATURE: 97.9 F | BODY MASS INDEX: 32.96 KG/M2 | DIASTOLIC BLOOD PRESSURE: 71 MMHG

## 2022-11-07 DIAGNOSIS — Z43.2 ATTENTION TO ILEOSTOMY (HCC): ICD-10-CM

## 2022-11-07 DIAGNOSIS — C20 RECTAL CANCER (HCC): Primary | ICD-10-CM

## 2022-11-07 PROCEDURE — 99024 POSTOP FOLLOW-UP VISIT: CPT | Performed by: COLON & RECTAL SURGERY

## 2022-11-07 NOTE — PROGRESS NOTES
Subjective: Tolerating diet and moving his bowels. Has some urgency. Past medical history and ROS were reviewed and unchanged. Abdomen: Soft, nontender nondistended  Ileostomy wound healed to the surface with some granulation tissue, touch or silver nitrate  Mediport site healed    Assessment / Plan    Status post robotic low anterior resection for stage III rectal cancer, chemotherapy, loop ileostomy reversal and Mediport removal  Diet as tolerated  Fiber supplement  Follow-up with the [facility] for surveillance and surveillance colonoscopy  Follow-up here should he have bowel issues or wound issues    The diagnoses and plan were discussed with patient. All questions answered. Plan of care agreed to by all concerned.

## 2022-11-07 NOTE — LETTER
11/7/2022    Patient: Amadeo Castillo   YOB: 1980   Date of Visit: 11/7/2022     Martine Hardy [facility] [address]  [address] [address]  Via Fax: [phone]     Cande Casillas MD  [address]  [address]  [address] [address] [address]  Via Fax: [phone]     Celena Pride MD  [address] [facility]  [address]  [address] [facility]  Via In [facility] [address]    Dear All. Ignacio Vu is seen in follow-up after loop ileostomy reversal.  He initially underwent robotic low anterior resection for a stage III rectal cancer this past February. He did well after his most recent surgery and was discharged without complication. In the office today his wounds are essentially healed and he is tolerating diet. Exam is benign. He will follow-up with [facility] for surveillance. I have also told him he should follow-up with [facility] for surveillance colonoscopy early next year. He can follow-up with me in the office if he has any wound or bowel issues. If you have questions, please do not hesitate to call me. I look forward to following your patient along with you.       Sincerely,    Ry Dockery MD

## 2023-01-11 ENCOUNTER — TRANSCRIBE ORDER (OUTPATIENT)
Dept: SCHEDULING | Age: 43
End: 2023-01-11

## 2023-01-11 DIAGNOSIS — C20 MALIGNANT NEOPLASM OF RECTUM (HCC): Primary | ICD-10-CM

## 2023-02-07 ENCOUNTER — HOSPITAL ENCOUNTER (OUTPATIENT)
Dept: CT IMAGING | Age: 43
Discharge: HOME | End: 2023-02-07
Attending: INTERNAL MEDICINE
Payer: COMMERCIAL

## 2023-02-07 DIAGNOSIS — C20 MALIGNANT NEOPLASM OF RECTUM (HCC): ICD-10-CM

## 2023-02-07 LAB — CREAT UR-MCNC: 0.9 MG/DL (ref 0.6–1.3)

## 2023-02-07 PROCEDURE — 74177 CT ABD & PELVIS W/CONTRAST: CPT

## 2023-02-07 PROCEDURE — 74011000636 HC RX REV CODE- 636: Performed by: INTERNAL MEDICINE

## 2023-02-07 PROCEDURE — 82565 ASSAY OF CREATININE: CPT

## 2023-02-07 RX ADMIN — DIATRIZOATE MEGLUMINE AND DIATRIZOATE SODIUM 30 ML: 660; 100 LIQUID ORAL; RECTAL at 14:32

## 2023-02-07 RX ADMIN — IOPAMIDOL 70 ML: 612 INJECTION, SOLUTION INTRAVENOUS at 14:32

## 2023-05-10 ENCOUNTER — HOSPITAL ENCOUNTER (OUTPATIENT)
Facility: HOSPITAL | Age: 43
Discharge: HOME | End: 2023-05-13
Payer: COMMERCIAL

## 2023-05-10 DIAGNOSIS — C20 MALIGNANT NEOPLASM OF RECTUM (HCC): ICD-10-CM

## 2023-05-10 LAB — CREAT UR-MCNC: 0.8 MG/DL (ref 0.6–1.3)

## 2023-05-10 PROCEDURE — 71260 CT THORAX DX C+: CPT

## 2023-05-10 PROCEDURE — 6360000004 HC RX CONTRAST MEDICATION: Performed by: INTERNAL MEDICINE

## 2023-05-10 PROCEDURE — 82565 ASSAY OF CREATININE: CPT

## 2023-05-10 RX ADMIN — IOPAMIDOL 100 ML: 612 INJECTION, SOLUTION INTRAVENOUS at 12:18

## 2023-08-22 ENCOUNTER — HOSPITAL ENCOUNTER (OUTPATIENT)
Facility: HOSPITAL | Age: 43
Discharge: HOME | End: 2023-08-25
Attending: INTERNAL MEDICINE
Payer: COMMERCIAL

## 2023-08-22 DIAGNOSIS — C20 MALIGNANT NEOPLASM OF RECTUM (HCC): ICD-10-CM

## 2023-08-22 PROCEDURE — 71260 CT THORAX DX C+: CPT

## 2023-08-22 PROCEDURE — 6360000004 HC RX CONTRAST MEDICATION: Performed by: INTERNAL MEDICINE

## 2023-08-22 PROCEDURE — 82565 ASSAY OF CREATININE: CPT

## 2023-08-22 RX ADMIN — IOPAMIDOL 100 ML: 612 INJECTION, SOLUTION INTRAVENOUS at 10:41

## 2023-08-23 LAB — CREAT UR-MCNC: 0.8 MG/DL (ref 0.6–1.3)

## 2023-11-27 ENCOUNTER — HOSPITAL ENCOUNTER (OUTPATIENT)
Facility: HOSPITAL | Age: 43
Discharge: HOME | End: 2023-11-30
Payer: COMMERCIAL

## 2023-11-27 DIAGNOSIS — C20 MALIGNANT NEOPLASM OF RECTUM (HCC): ICD-10-CM

## 2023-11-27 LAB — CREAT UR-MCNC: 0.8 MG/DL (ref 0.6–1.3)

## 2023-11-27 PROCEDURE — 82565 ASSAY OF CREATININE: CPT

## 2023-11-27 PROCEDURE — 6360000004 HC RX CONTRAST MEDICATION: Performed by: PHYSICIAN ASSISTANT

## 2023-11-27 PROCEDURE — 74177 CT ABD & PELVIS W/CONTRAST: CPT

## 2023-11-27 RX ADMIN — IOPAMIDOL 100 ML: 612 INJECTION, SOLUTION INTRAVENOUS at 09:10

## 2024-05-14 NOTE — LETTER
11/18/2021    Patient: Demarco Melvin   YOB: 1980   Date of Visit: 11/18/2021     [facility]  [address] [address]  Via Fax: [phone]     Mateus Singh MD  [address] [facility]  [address]  [address] [address] [address]  Via Fax: [phone]     Bob Morris MD  [address] [place] [address]  [address]  [address] [facility]  Via In [facility] [address]    Dear Galen Burks,    I saw Jose L [facility] in the office today for his newly diagnosed rectal cancer. Colonoscopy showed a tumor 10 to 15 cm from the anal verge. The tumor was not palpable on digital rectal exam.  I will follow up on the CT imaging you ordered and add MRI of the pelvis to his staging. I will also perform flexible sigmoidoscopy for surgical planning. He will then return to the office to decide on the next steps. If you have questions, please do not hesitate to call me. I look forward to following your patient along with you.       Sincerely,    Mark Forde MD Spoke with patient who agrees to come 5/20/24 at 1000 in [place] for nurse visit with Dr. Vu to discuss cath. Patient requests cath be on a Tuesday. Dr. Vu and appt desk updated.

## 2024-05-15 ENCOUNTER — TRANSCRIBE ORDERS (OUTPATIENT)
Facility: HOSPITAL | Age: 44
End: 2024-05-15

## 2024-05-15 DIAGNOSIS — C20 MALIGNANT NEOPLASM OF RECTUM (HCC): Primary | ICD-10-CM

## 2024-05-28 ENCOUNTER — HOSPITAL ENCOUNTER (OUTPATIENT)
Facility: HOSPITAL | Age: 44
Discharge: HOME | End: 2024-05-31
Payer: COMMERCIAL

## 2024-05-28 DIAGNOSIS — C20 PRIMARY MALIGNANT NEOPLASM OF RECTUM (HCC): ICD-10-CM

## 2024-05-28 PROCEDURE — 82565 ASSAY OF CREATININE: CPT

## 2024-05-28 PROCEDURE — 6360000004 HC RX CONTRAST MEDICATION: Performed by: PHYSICIAN ASSISTANT

## 2024-05-28 PROCEDURE — 71260 CT THORAX DX C+: CPT

## 2024-05-28 RX ADMIN — IOPAMIDOL 100 ML: 612 INJECTION, SOLUTION INTRAVENOUS at 14:53

## 2024-05-29 LAB — CREAT UR-MCNC: 0.9 MG/DL (ref 0.6–1.3)

## 2024-12-09 ENCOUNTER — HOSPITAL ENCOUNTER (OUTPATIENT)
Facility: HOSPITAL | Age: 44
Discharge: HOME | End: 2024-12-12
Payer: COMMERCIAL

## 2024-12-09 DIAGNOSIS — K59.00 CONSTIPATION IN MALE: ICD-10-CM

## 2024-12-09 PROCEDURE — 74019 RADEX ABDOMEN 2 VIEWS: CPT

## (undated) DEVICE — GOWN ISOL IMPERV UNIV, DISP, OPEN BACK, BLUE --

## (undated) DEVICE — DRAPE TWL SURG 16X26IN BLU ORB04] ALLCARE INC]

## (undated) DEVICE — KIT,ANTI FOG,W/SPONGE & FLUID,SOFT PACK: Brand: MEDLINE

## (undated) DEVICE — CATHETER THOR 36FR DIA10.7MM POLYVI CHL TRCR TIP STR SFT

## (undated) DEVICE — REM POLYHESIVE ADULT PATIENT RETURN ELECTRODE: Brand: VALLEYLAB

## (undated) DEVICE — COVER LT HNDL BLU STRL -- MEDICHOICE

## (undated) DEVICE — GAUZE,SPONGE,4"X4",12PLY,STERILE,LF,2'S: Brand: MEDLINE

## (undated) DEVICE — SYR 50ML SLIP TIP NSAF LF STRL --

## (undated) DEVICE — VISUALIZATION SYSTEM: Brand: CLEARIFY

## (undated) DEVICE — DRESSING,GAUZE,XEROFORM,CURAD,1"X8",ST: Brand: CURAD

## (undated) DEVICE — RELOAD STPL L75MM OPN H3.8MM CLS 1.5MM WIRE DIA0.2MM REG

## (undated) DEVICE — DRESSING FOAM DISK DIA1IN H 7MM HYDRPHLC CHG IMPREG IN SL

## (undated) DEVICE — PACK PROCEDURE SURG MAJ W/ BASIN LF

## (undated) DEVICE — LAPAROSCOPIC TROCAR SLEEVE/SINGLE USE: Brand: KII® OPTICAL ACCESS SYSTEM

## (undated) DEVICE — SUTURE VCRL SZ 2-0 L27IN ABSRB UD L26MM SH 1/2 CIR J417H

## (undated) DEVICE — TAPE,CLOTH/SILK,CURAD,3"X10YD,LF,40/CS: Brand: CURAD

## (undated) DEVICE — STAPLER 45 RELOAD: Brand: ENDOWRIST

## (undated) DEVICE — SOLUTION IRRIG 1000ML H2O STRL BLT

## (undated) DEVICE — VESSEL SEALER: Brand: ENDOWRIST

## (undated) DEVICE — INSUFFLATION NEEDLE TO ESTABLISH PNEUMOPERITONEUM.: Brand: INSUFFLATION NEEDLE

## (undated) DEVICE — GARMENT,MEDLINE,DVT,INT,CALF,MED, GEN2: Brand: MEDLINE

## (undated) DEVICE — BLADELESS OBTURATOR: Brand: WECK VISTA

## (undated) DEVICE — SUTURE VCRL SZ 0 L18IN ABSRB UD POLYGLACTIN 910 BRAID TIE J912G

## (undated) DEVICE — GAUZE,SPONGE,4"X4",16PLY,STRL,LF,10/TRAY: Brand: MEDLINE

## (undated) DEVICE — MEDI-VAC SUCTION HIGH CAPACITY: Brand: CARDINAL HEALTH

## (undated) DEVICE — STAPLER INT DIA29MM CLS STPL H1.5-2.2MM OPN LEG L5.2MM 26

## (undated) DEVICE — FLUFF AND POLYMER UNDERPAD,EXTRA HEAVY: Brand: WINGS

## (undated) DEVICE — SPONGE LAP 18X18IN STRL -- 5/PK

## (undated) DEVICE — REDUCER CANN ENDOWRIST 12-8MM -- DA VINCI XI - SNGL USE

## (undated) DEVICE — ELECTRO LUBE IS A SINGLE PATIENT USE DEVICE THAT IS INTENDED TO BE USED ON ELECTROSURGICAL ELECTRODES TO REDUCE STICKING.: Brand: KEY SURGICAL ELECTRO LUBE

## (undated) DEVICE — SYRINGE MED 25GA 3ML L5/8IN SUBQ PLAS W/ DETACH NDL SFTY

## (undated) DEVICE — DECANTER BAG 9": Brand: MEDLINE INDUSTRIES, INC.

## (undated) DEVICE — SEAL UNIV 5-8MM DISP BX/10 -- DA VINCI XI - SNGL USE

## (undated) DEVICE — Device

## (undated) DEVICE — DEVICE INFL 60ML 12ATM CONVENIENT LOK REL HNDL HI PRSS FLX

## (undated) DEVICE — YANKAUER,SMOOTH HANDLE,HIGH CAPACITY: Brand: MEDLINE INDUSTRIES, INC.

## (undated) DEVICE — STAPLER INT L60MM REG TISS BLU B FRM 8 FIRING 2 ROW AUTO

## (undated) DEVICE — SUTURE PDS II SZ 1 L36IN ABSRB VLT CTXB L48MM 1/2 CIR BLNT ZB371

## (undated) DEVICE — TRAY PREP DRY W/ PREM GLV 2 APPL 6 SPNG 2 UNDPD 1 OVERWRAP

## (undated) DEVICE — SET SUCT IRR TIP DISP STRYKEFLOW2

## (undated) DEVICE — STAPLER SHEATH: Brand: ENDOWRIST

## (undated) DEVICE — SOLUTION IV 1000ML 0.9% SOD CHL

## (undated) DEVICE — STAPLER INT L75MM CUT LN L73MM STPL LN L77MM BLU B FRM 8

## (undated) DEVICE — DRAIN SURG 15FR L3/16IN DIA4.7MM SIL CHN RND HUBLESS FULL

## (undated) DEVICE — THREE-QUARTER SHEET: Brand: CONVERTORS

## (undated) DEVICE — SNARE POLYP M W27MMXL240CM OVL STIFF DISP CAPTIVATOR

## (undated) DEVICE — FLEX ADVANTAGE 3000CC: Brand: FLEX ADVANTAGE

## (undated) DEVICE — 3M™ MEDIPORE™ H SOFT CLOTH SURGICAL TAPE 2864, 4 INCH X 10 YARD (10CM X 9,14M), 12 ROLLS/CASE: Brand: 3M™ MEDIPORE™

## (undated) DEVICE — STRIP,CLOSURE,WOUND,MEDI-STRIP,1/2X4: Brand: MEDLINE

## (undated) DEVICE — BLANKET WRM W40.2XL55.9IN IORT LO BODY + MISTRAL AIR

## (undated) DEVICE — SUTURE VCRL SZ 3-0 L27IN ABSRB VLT L26MM SH 1/2 CIR J316H

## (undated) DEVICE — 3M™ STERI-DRAPE™ INSTRUMENT POUCH 1018L: Brand: STERI-DRAPE™

## (undated) DEVICE — SUTURE PROL SZ 0 L30IN NONABSORBABLE BLU L36MM CT-1 1/2 CIR 8424H

## (undated) DEVICE — STERILE POLYISOPRENE POWDER-FREE SURGICAL GLOVES: Brand: PROTEXIS

## (undated) DEVICE — TRAY,URINE METER,100% SILICONE,16FR10ML: Brand: MEDLINE

## (undated) DEVICE — SUTURE VCRL SZ 0 L27IN ABSRB UD L36MM CT-1 1/2 CIR J260H

## (undated) DEVICE — GARMENT,MEDLINE,DVT,INT,CALF,FOAM,MED: Brand: MEDLINE

## (undated) DEVICE — MEDI-VAC NON-CONDUCTIVE SUCTION TUBING: Brand: CARDINAL HEALTH

## (undated) DEVICE — ARM DRAPE

## (undated) DEVICE — SUT SLK 3-0 30IN SH BLK --

## (undated) DEVICE — 40580 - THE PINK PAD - ADVANCED TRENDELENBURG POSITIONING KIT: Brand: 40580 - THE PINK PAD - ADVANCED TRENDELENBURG POSITIONING KIT

## (undated) DEVICE — ENDOSCOPY PUMP TUBING/ CAP SET: Brand: ERBE

## (undated) DEVICE — SEAL

## (undated) DEVICE — DRAPE XR C ARM 41X74IN LF --

## (undated) DEVICE — BLANKET WRM AD W50XL85.8IN PACU FULL BODY FORC AIR

## (undated) DEVICE — SYR 10ML CTRL LR LCK NSAF LF --

## (undated) DEVICE — PAD,ABDOMINAL,8"X10",ST,LF: Brand: MEDLINE

## (undated) DEVICE — INTENDED FOR TISSUE SEPARATION, AND OTHER PROCEDURES THAT REQUIRE A SHARP SURGICAL BLADE TO PUNCTURE OR CUT.: Brand: BARD-PARKER ®  SAFETY SCALPED

## (undated) DEVICE — COVER MPLR TIP CRV SCIS ACC DA VINCI

## (undated) DEVICE — SOLUTION SCRB 4OZ 10% PVP I POVIDONE IOD TOP PAINT EXIDINE

## (undated) DEVICE — FORCEPS BX L240CM JAW DIA2.8MM L CAP W/ NDL MIC MESH TOOTH

## (undated) DEVICE — DRAPE,UNDERBUTTOCKS,PCH,STERILE: Brand: MEDLINE

## (undated) DEVICE — NEEDLE HYPO 25GA L1.5IN BVL ORIENTED ECLIPSE

## (undated) DEVICE — SHEET, T, LAPAROTOMY, STERILE: Brand: MEDLINE

## (undated) DEVICE — COLUMN DRAPE

## (undated) DEVICE — CANNULA NSL AD TBNG L14FT STD PVC O2 CRV CONN NONFLARED NSL

## (undated) DEVICE — SUTURE MCRYL SZ 4-0 L18IN ABSRB UD L19MM PS-2 3/8 CIR PRIM Y496G

## (undated) DEVICE — KIT CLN UP BON SECOURS MARYV

## (undated) DEVICE — PREP SKN CHLRAPRP APL 26ML STR --

## (undated) DEVICE — SYSTEM INF CTRL

## (undated) DEVICE — 48" PROBE COVER W/GEL, ULTRASOUND, STERILE: Brand: SITE-RITE

## (undated) DEVICE — PURSE STRING DEVICE: Brand: PURSTRING

## (undated) DEVICE — SUTURE VCRL SZ 3-0 L27IN ABSRB UD L26MM SH 1/2 CIR J416H

## (undated) DEVICE — SYR 10ML LUER LOK 1/5ML GRAD --

## (undated) DEVICE — CANNULA ORIG TL CLR W FOAM CUSHIONS AND 14FT SUPL TB 3 CHN

## (undated) DEVICE — 3M™ TEGADERM™ TRANSPARENT FILM DRESSING FRAME STYLE, 1626W, 4 IN X 4-3/4 IN (10 CM X 12 CM), 50/CT 4CT/CASE: Brand: 3M™ TEGADERM™

## (undated) DEVICE — INTENDED FOR TISSUE SEPARATION, AND OTHER PROCEDURES THAT REQUIRE A SHARP SURGICAL BLADE TO PUNCTURE OR CUT.: Brand: BARD-PARKER ® CARBON RIB-BACK BLADES

## (undated) DEVICE — SYR 20ML LL STRL LF --

## (undated) DEVICE — LAPAROSCOPIC ACCESS SYSTEM: Brand: ALEXIS LAPAROSCOPIC SYSTEM WITH KII FIOS FIRST ENTRY

## (undated) DEVICE — FCPS ENDOSCP 5MMX33CM -- ENDOPATH

## (undated) DEVICE — CATHETER SUCT TR FL TIP 14FR W/ O CTRL

## (undated) DEVICE — INTENDED FOR TISSUE SEPARATION, AND OTHER PROCEDURES THAT REQUIRE A SHARP SURGICAL BLADE TO PUNCTURE OR CUT.: Brand: BARD-PARKER SAFETY BLADES SIZE 15, STERILE

## (undated) DEVICE — SUCTION IRRIGATOR: Brand: ENDOWRIST